# Patient Record
Sex: MALE | Race: OTHER | Employment: FULL TIME | URBAN - METROPOLITAN AREA
[De-identification: names, ages, dates, MRNs, and addresses within clinical notes are randomized per-mention and may not be internally consistent; named-entity substitution may affect disease eponyms.]

---

## 2017-08-03 ENCOUNTER — OFFICE VISIT (OUTPATIENT)
Dept: FAMILY MEDICINE CLINIC | Age: 42
End: 2017-08-03

## 2017-08-03 ENCOUNTER — HOSPITAL ENCOUNTER (OUTPATIENT)
Dept: OTHER | Age: 42
Discharge: HOME | End: 2017-08-03
Attending: FAMILY MEDICINE | Admitting: FAMILY MEDICINE

## 2017-08-03 ENCOUNTER — HOSPITAL ENCOUNTER (OUTPATIENT)
Dept: GENERAL RADIOLOGY | Age: 42
Discharge: HOME | End: 2017-08-03

## 2017-08-03 ENCOUNTER — HOSPITAL ENCOUNTER (OUTPATIENT)
Dept: GENERAL RADIOLOGY | Age: 42
Discharge: HOME | End: 2017-08-03

## 2017-08-03 VITALS
OXYGEN SATURATION: 96 % | DIASTOLIC BLOOD PRESSURE: 70 MMHG | WEIGHT: 244 LBS | HEART RATE: 86 BPM | BODY MASS INDEX: 33.09 KG/M2 | SYSTOLIC BLOOD PRESSURE: 120 MMHG

## 2017-08-03 DIAGNOSIS — M79.641 PAIN OF RIGHT HAND: ICD-10-CM

## 2017-08-03 DIAGNOSIS — M79.641 PAIN OF RIGHT HAND: Primary | ICD-10-CM

## 2017-08-03 DIAGNOSIS — M25.531 WRIST PAIN, ACUTE, RIGHT: ICD-10-CM

## 2017-08-03 DIAGNOSIS — M25.531 PAIN IN RIGHT WRIST: ICD-10-CM

## 2017-08-03 PROCEDURE — 99213 OFFICE O/P EST LOW 20 MIN: CPT | Performed by: FAMILY MEDICINE

## 2017-08-03 RX ORDER — IBUPROFEN 800 MG/1
800 TABLET ORAL EVERY 8 HOURS PRN
Qty: 90 TABLET | Refills: 2 | Status: SHIPPED | OUTPATIENT
Start: 2017-08-03 | End: 2019-10-17 | Stop reason: SDUPTHER

## 2017-08-09 ENCOUNTER — TELEPHONE (OUTPATIENT)
Dept: FAMILY MEDICINE CLINIC | Age: 42
End: 2017-08-09

## 2017-08-09 DIAGNOSIS — M79.641 PAIN OF RIGHT HAND: Primary | ICD-10-CM

## 2017-08-14 ENCOUNTER — OFFICE VISIT (OUTPATIENT)
Dept: FAMILY MEDICINE CLINIC | Age: 42
End: 2017-08-14

## 2017-08-14 VITALS
BODY MASS INDEX: 32.96 KG/M2 | TEMPERATURE: 98.4 F | HEART RATE: 103 BPM | OXYGEN SATURATION: 96 % | DIASTOLIC BLOOD PRESSURE: 80 MMHG | WEIGHT: 243 LBS | SYSTOLIC BLOOD PRESSURE: 118 MMHG

## 2017-08-14 DIAGNOSIS — E78.2 MIXED HYPERLIPIDEMIA: ICD-10-CM

## 2017-08-14 DIAGNOSIS — R42 DIZZY: ICD-10-CM

## 2017-08-14 DIAGNOSIS — R07.9 CHEST PAIN, UNSPECIFIED TYPE: ICD-10-CM

## 2017-08-14 DIAGNOSIS — Z00.00 ANNUAL PHYSICAL EXAM: Primary | ICD-10-CM

## 2017-08-14 DIAGNOSIS — H61.23 BILATERAL IMPACTED CERUMEN: ICD-10-CM

## 2017-08-14 PROCEDURE — 99396 PREV VISIT EST AGE 40-64: CPT | Performed by: FAMILY MEDICINE

## 2017-08-14 RX ORDER — CHOLECALCIFEROL (VITAMIN D3) 50 MCG
TABLET ORAL
Qty: 30 TABLET | Refills: 10 | Status: SHIPPED | OUTPATIENT
Start: 2017-08-14 | End: 2018-07-27 | Stop reason: SDUPTHER

## 2017-08-20 ENCOUNTER — HOSPITAL ENCOUNTER (OUTPATIENT)
Dept: OTHER | Age: 42
Discharge: HOME | End: 2017-08-20
Attending: FAMILY MEDICINE | Admitting: FAMILY MEDICINE

## 2017-08-20 LAB
A/G RATIO: 1.7 (ref 1.1–2.2)
ALBUMIN SERPL-MCNC: 4.6 G/DL (ref 3.4–5)
ALP BLD-CCNC: 81 U/L (ref 40–129)
ALT SERPL-CCNC: 14 U/L (ref 10–40)
ANION GAP SERPL CALCULATED.3IONS-SCNC: 11 MMOL/L (ref 3–16)
AST SERPL-CCNC: 18 U/L (ref 15–37)
BASOPHILS ABSOLUTE: 0 K/UL (ref 0–0.2)
BASOPHILS RELATIVE PERCENT: 0.6 %
BILIRUB SERPL-MCNC: 0.4 MG/DL (ref 0–1)
BUN BLDV-MCNC: 15 MG/DL (ref 7–20)
CALCIUM SERPL-MCNC: 9.8 MG/DL (ref 8.3–10.6)
CHLORIDE BLD-SCNC: 103 MMOL/L (ref 99–110)
CHOLESTEROL, TOTAL: 161 MG/DL (ref 0–199)
CO2: 28 MMOL/L (ref 21–32)
CREAT SERPL-MCNC: 0.7 MG/DL (ref 0.9–1.3)
EOSINOPHILS ABSOLUTE: 0.1 K/UL (ref 0–0.6)
EOSINOPHILS RELATIVE PERCENT: 1.6 %
GFR AFRICAN AMERICAN: >60
GFR NON-AFRICAN AMERICAN: >60
GLOBULIN: 2.7 G/DL
GLUCOSE BLD-MCNC: 106 MG/DL (ref 70–99)
HCT VFR BLD CALC: 43.7 % (ref 40.5–52.5)
HDLC SERPL-MCNC: 26 MG/DL (ref 40–60)
HEMOGLOBIN: 15 G/DL (ref 13.5–17.5)
LDL CHOLESTEROL CALCULATED: 80 MG/DL
LYMPHOCYTES ABSOLUTE: 1.4 K/UL (ref 1–5.1)
LYMPHOCYTES RELATIVE PERCENT: 21.3 %
MCH RBC QN AUTO: 29.5 PG (ref 26–34)
MCHC RBC AUTO-ENTMCNC: 34.3 G/DL (ref 31–36)
MCV RBC AUTO: 85.8 FL (ref 80–100)
MONOCYTES ABSOLUTE: 0.5 K/UL (ref 0–1.3)
MONOCYTES RELATIVE PERCENT: 6.9 %
NEUTROPHILS ABSOLUTE: 4.6 K/UL (ref 1.7–7.7)
NEUTROPHILS RELATIVE PERCENT: 69.6 %
PDW BLD-RTO: 12.7 % (ref 12.4–15.4)
PLATELET # BLD: 207 K/UL (ref 135–450)
PMV BLD AUTO: 9.1 FL (ref 5–10.5)
POTASSIUM SERPL-SCNC: 5 MMOL/L (ref 3.5–5.1)
RBC # BLD: 5.09 M/UL (ref 4.2–5.9)
SODIUM BLD-SCNC: 142 MMOL/L (ref 136–145)
TOTAL PROTEIN: 7.3 G/DL (ref 6.4–8.2)
TRIGL SERPL-MCNC: 274 MG/DL (ref 0–150)
VITAMIN D 25-HYDROXY: 27.9 NG/ML
VLDLC SERPL CALC-MCNC: 55 MG/DL
WBC # BLD: 6.6 K/UL (ref 4–11)

## 2017-08-21 DIAGNOSIS — R73.01 IFG (IMPAIRED FASTING GLUCOSE): Primary | ICD-10-CM

## 2017-08-22 ENCOUNTER — OFFICE VISIT (OUTPATIENT)
Dept: ORTHOPEDIC SURGERY | Age: 42
End: 2017-08-22

## 2017-08-22 VITALS — BODY MASS INDEX: 32.91 KG/M2 | WEIGHT: 242.95 LBS | HEIGHT: 72 IN

## 2017-08-22 DIAGNOSIS — S63.501A WRIST SPRAIN, RIGHT, INITIAL ENCOUNTER: Primary | ICD-10-CM

## 2017-08-22 PROBLEM — S63.509A WRIST SPRAIN: Status: ACTIVE | Noted: 2017-08-22

## 2017-08-22 LAB
ESTIMATED AVERAGE GLUCOSE: 111.2 MG/DL
HBA1C MFR BLD: 5.5 %

## 2017-08-22 PROCEDURE — 99243 OFF/OP CNSLTJ NEW/EST LOW 30: CPT | Performed by: ORTHOPAEDIC SURGERY

## 2017-08-22 PROCEDURE — L3918 METACARP FX ORTHOSIS PRE OTS: HCPCS | Performed by: ORTHOPAEDIC SURGERY

## 2017-10-30 ENCOUNTER — OFFICE VISIT (OUTPATIENT)
Dept: FAMILY MEDICINE CLINIC | Age: 42
End: 2017-10-30

## 2017-10-30 VITALS
HEART RATE: 96 BPM | WEIGHT: 247 LBS | BODY MASS INDEX: 33.46 KG/M2 | SYSTOLIC BLOOD PRESSURE: 120 MMHG | DIASTOLIC BLOOD PRESSURE: 84 MMHG | OXYGEN SATURATION: 96 %

## 2017-10-30 DIAGNOSIS — Z23 NEEDS FLU SHOT: ICD-10-CM

## 2017-10-30 DIAGNOSIS — R42 VERTIGO: Primary | ICD-10-CM

## 2017-10-30 DIAGNOSIS — R06.83 SNORES: ICD-10-CM

## 2017-10-30 DIAGNOSIS — G56.01 CARPAL TUNNEL SYNDROME OF RIGHT WRIST: ICD-10-CM

## 2017-10-30 PROCEDURE — G8427 DOCREV CUR MEDS BY ELIG CLIN: HCPCS | Performed by: FAMILY MEDICINE

## 2017-10-30 PROCEDURE — G8484 FLU IMMUNIZE NO ADMIN: HCPCS | Performed by: FAMILY MEDICINE

## 2017-10-30 PROCEDURE — 90471 IMMUNIZATION ADMIN: CPT | Performed by: FAMILY MEDICINE

## 2017-10-30 PROCEDURE — 1036F TOBACCO NON-USER: CPT | Performed by: FAMILY MEDICINE

## 2017-10-30 PROCEDURE — G8417 CALC BMI ABV UP PARAM F/U: HCPCS | Performed by: FAMILY MEDICINE

## 2017-10-30 PROCEDURE — 99214 OFFICE O/P EST MOD 30 MIN: CPT | Performed by: FAMILY MEDICINE

## 2017-10-30 PROCEDURE — 90630 INFLUENZA, QUADV, 18-64 YRS, ID, PF, MICRO INJ, 0.1ML (FLUZONE QUADV, PF): CPT | Performed by: FAMILY MEDICINE

## 2017-10-30 RX ORDER — MECLIZINE HYDROCHLORIDE CHEWABLE TABLETS 25 MG/1
25 TABLET, CHEWABLE ORAL 3 TIMES DAILY PRN
Qty: 30 TABLET | Refills: 0 | Status: SHIPPED | OUTPATIENT
Start: 2017-10-30 | End: 2019-09-24 | Stop reason: ALTCHOICE

## 2017-10-30 ASSESSMENT — ENCOUNTER SYMPTOMS
CHOKING: 0
EYES NEGATIVE: 1
COUGH: 0
APNEA: 0
SHORTNESS OF BREATH: 0

## 2017-10-30 NOTE — PROGRESS NOTES
Vaccine Information Sheet, \"Influenza - Inactivated\"  given to [employer], or parent/legal guardian of  [place] and verbalized understanding. Patient responses:    Have you ever had a reaction to a flu vaccine? No  Are you able to eat eggs without adverse effects? Yes  Do you have any current illness? No  Have you ever had Guillian Lennox Syndrome? No    Flu vaccine given per order. Please see immunization tab.
nl, Neck supple, no lad or tmg, no bruit  CV RRR, Lungs CTA  Abd soft, NT, no masses or hsm, BS nl  Ext with strong pedal pulses, no edema    Assessment:            Plan:      Mahad Cummings was seen today for dizziness and other. Diagnoses and all orders for this visit:    Vertigo x 4  -     meclizine (ANTIVERT) 25 MG CHEW; Take 1 tablet by mouth 3 times daily as needed (dizziness)  -     External Referral To ENT    Snores  -     External Referral To ENT, possible deviated septum    Carpal tunnel syndrome of right wrist   Pt is asked to wear splint that Dr Jessica Irene gave him to wear at work at .     Needs flu shot  -     INFLUENZA, QUADV, 18-64 YRS, ID, PF, MICRO INJ, 0.1ML (FLUZONE QUADV, PF)

## 2017-12-20 ENCOUNTER — OFFICE VISIT (OUTPATIENT)
Dept: FAMILY MEDICINE CLINIC | Age: 42
End: 2017-12-20

## 2017-12-20 VITALS
BODY MASS INDEX: 33.21 KG/M2 | TEMPERATURE: 97.8 F | WEIGHT: 245.2 LBS | OXYGEN SATURATION: 97 % | DIASTOLIC BLOOD PRESSURE: 84 MMHG | HEART RATE: 68 BPM | SYSTOLIC BLOOD PRESSURE: 124 MMHG

## 2017-12-20 DIAGNOSIS — R30.9 PAINFUL URINATION: Primary | ICD-10-CM

## 2017-12-20 LAB
BILIRUBIN, POC: NORMAL
BLOOD URINE, POC: NORMAL
CLARITY, POC: CLEAR
COLOR, POC: YELLOW
GLUCOSE URINE, POC: NORMAL
KETONES, POC: NORMAL
LEUKOCYTE EST, POC: NORMAL
NITRITE, POC: NORMAL
PH, POC: 6
PROTEIN, POC: 30
SPECIFIC GRAVITY, POC: 1.02
UROBILINOGEN, POC: 0.2

## 2017-12-20 PROCEDURE — G8417 CALC BMI ABV UP PARAM F/U: HCPCS | Performed by: FAMILY MEDICINE

## 2017-12-20 PROCEDURE — 99213 OFFICE O/P EST LOW 20 MIN: CPT | Performed by: FAMILY MEDICINE

## 2017-12-20 PROCEDURE — G8484 FLU IMMUNIZE NO ADMIN: HCPCS | Performed by: FAMILY MEDICINE

## 2017-12-20 PROCEDURE — 81002 URINALYSIS NONAUTO W/O SCOPE: CPT | Performed by: FAMILY MEDICINE

## 2017-12-20 PROCEDURE — G8427 DOCREV CUR MEDS BY ELIG CLIN: HCPCS | Performed by: FAMILY MEDICINE

## 2017-12-20 PROCEDURE — 1036F TOBACCO NON-USER: CPT | Performed by: FAMILY MEDICINE

## 2017-12-20 RX ORDER — CIPROFLOXACIN 500 MG/1
500 TABLET, FILM COATED ORAL 2 TIMES DAILY
Qty: 20 TABLET | Refills: 0 | Status: SHIPPED | OUTPATIENT
Start: 2017-12-20 | End: 2017-12-30

## 2017-12-20 RX ORDER — PHENAZOPYRIDINE HYDROCHLORIDE 100 MG/1
100 TABLET, FILM COATED ORAL 3 TIMES DAILY PRN
Qty: 15 TABLET | Refills: 0 | Status: SHIPPED | OUTPATIENT
Start: 2017-12-20 | End: 2019-09-24 | Stop reason: ALTCHOICE

## 2017-12-20 NOTE — PROGRESS NOTES
Subjective:      Patient ID: Letty Hameed is a 36 y.o. male. HPI  Chief Complaint   Patient presents with    Urinary Tract Infection   Awoke 3 am per usual for work, immediately at end of 1st void and since, has burning sensation. Also noted mild mid- lower back. Left work at 5:30 am b/c of urinary freq, shaking as if he was cold, unsure if he had fever. No chance for STI. Has burning sensation between legs in recent hours. Dad had kidney stones. Review of Systems   Constitutional: Positive for chills. Negative for activity change, appetite change, diaphoresis, fatigue and unexpected weight change. HENT: Negative. Respiratory: Negative for chest tightness and shortness of breath. Cardiovascular: Negative for chest pain and palpitations. Gastrointestinal: Negative for abdominal pain, nausea and vomiting. Genitourinary: Positive for dysuria, flank pain, frequency and urgency. Negative for difficulty urinating and enuresis. Objective:   Physical Exam  /84   Pulse 68   Temp 97.8 °F (36.6 °C) (Oral)   Wt 245 lb 3.2 oz (111.2 kg)   SpO2 97%   BMI 33.21 kg/m²   No CVAT  Abd soft, NT, no masses or hsm  Assessment:            Plan:      Nora Lam was seen today for urinary tract infection. Diagnoses and all orders for this visit:    Painful urination, uti vs prostatitis vs stone  -     POCT Urinalysis no Micro, trace blood  -     ciprofloxacin (CIPRO) 500 MG tablet; Take 1 tablet by mouth 2 times daily for 10 days  -     URINE CULTURE  -     phenazopyridine (PYRIDIUM) 100 MG tablet; Take 1 tablet by mouth 3 times daily as needed for Pain (urinary pain)   It not improving by time pt is called with urine cx results, will plan for CT with stone protocol.

## 2017-12-21 ASSESSMENT — ENCOUNTER SYMPTOMS
SHORTNESS OF BREATH: 0
CHEST TIGHTNESS: 0
ABDOMINAL PAIN: 0
VOMITING: 0
NAUSEA: 0

## 2017-12-22 LAB — URINE CULTURE, ROUTINE: NORMAL

## 2018-07-27 RX ORDER — CHOLECALCIFEROL (VITAMIN D3) 50 MCG
TABLET ORAL
Qty: 30 TABLET | Refills: 5 | Status: SHIPPED | OUTPATIENT
Start: 2018-07-27 | End: 2019-01-18 | Stop reason: SDUPTHER

## 2018-08-23 ENCOUNTER — OFFICE VISIT (OUTPATIENT)
Dept: FAMILY MEDICINE CLINIC | Age: 43
End: 2018-08-23

## 2018-08-23 VITALS
WEIGHT: 256 LBS | TEMPERATURE: 98.2 F | DIASTOLIC BLOOD PRESSURE: 80 MMHG | HEART RATE: 80 BPM | BODY MASS INDEX: 34.72 KG/M2 | SYSTOLIC BLOOD PRESSURE: 120 MMHG

## 2018-08-23 DIAGNOSIS — H61.23 BILATERAL IMPACTED CERUMEN: Primary | ICD-10-CM

## 2018-08-23 PROCEDURE — 69210 REMOVE IMPACTED EAR WAX UNI: CPT | Performed by: NURSE PRACTITIONER

## 2018-08-23 PROCEDURE — G8427 DOCREV CUR MEDS BY ELIG CLIN: HCPCS | Performed by: NURSE PRACTITIONER

## 2018-08-23 PROCEDURE — 1036F TOBACCO NON-USER: CPT | Performed by: NURSE PRACTITIONER

## 2018-08-23 PROCEDURE — G8417 CALC BMI ABV UP PARAM F/U: HCPCS | Performed by: NURSE PRACTITIONER

## 2018-08-23 PROCEDURE — 99213 OFFICE O/P EST LOW 20 MIN: CPT | Performed by: NURSE PRACTITIONER

## 2018-08-23 ASSESSMENT — ENCOUNTER SYMPTOMS
COUGH: 0
SHORTNESS OF BREATH: 0

## 2018-08-28 ENCOUNTER — HOSPITAL ENCOUNTER (EMERGENCY)
Age: 43
Discharge: HOME | End: 2018-08-28
Payer: MEDICAID

## 2018-08-28 VITALS
OXYGEN SATURATION: 98 % | BODY MASS INDEX: 15.58 KG/M2 | HEART RATE: 92 BPM | RESPIRATION RATE: 16 BRPM | HEIGHT: 72 IN | WEIGHT: 115 LBS | TEMPERATURE: 97.8 F | SYSTOLIC BLOOD PRESSURE: 129 MMHG | DIASTOLIC BLOOD PRESSURE: 89 MMHG

## 2018-08-28 DIAGNOSIS — H10.13 ALLERGIC CONJUNCTIVITIS OF BOTH EYES: Primary | ICD-10-CM

## 2018-08-28 PROCEDURE — 99282 EMERGENCY DEPT VISIT SF MDM: CPT

## 2018-08-28 PROCEDURE — 6370000000 HC RX 637 (ALT 250 FOR IP): Performed by: EMERGENCY MEDICINE

## 2018-08-28 RX ORDER — KETOTIFEN FUMARATE 0.35 MG/ML
1 SOLUTION/ DROPS OPHTHALMIC 2 TIMES DAILY
Qty: 5 ML | Refills: 0 | Status: SHIPPED | OUTPATIENT
Start: 2018-08-28 | End: 2018-09-07

## 2018-08-28 RX ORDER — TETRACAINE HYDROCHLORIDE 5 MG/ML
1 SOLUTION OPHTHALMIC ONCE
Status: COMPLETED | OUTPATIENT
Start: 2018-08-28 | End: 2018-08-28

## 2018-08-28 RX ADMIN — TETRACAINE HYDROCHLORIDE 1 DROP: 5 SOLUTION OPHTHALMIC at 20:46

## 2018-08-28 RX ADMIN — Medication 1 MG: at 20:46

## 2018-08-28 ASSESSMENT — PAIN DESCRIPTION - LOCATION: LOCATION: EYE

## 2018-08-28 ASSESSMENT — PAIN DESCRIPTION - ONSET: ONSET: ON-GOING

## 2018-08-28 ASSESSMENT — PAIN SCALES - GENERAL
PAINLEVEL_OUTOF10: 3
PAINLEVEL_OUTOF10: 4

## 2018-08-28 ASSESSMENT — PAIN DESCRIPTION - PAIN TYPE: TYPE: ACUTE PAIN

## 2018-08-28 ASSESSMENT — PAIN DESCRIPTION - DESCRIPTORS: DESCRIPTORS: BURNING;STABBING;SHARP

## 2018-08-28 ASSESSMENT — PAIN DESCRIPTION - FREQUENCY: FREQUENCY: CONTINUOUS

## 2018-08-28 ASSESSMENT — PAIN DESCRIPTION - PROGRESSION: CLINICAL_PROGRESSION: NOT CHANGED

## 2018-08-28 ASSESSMENT — PAIN DESCRIPTION - ORIENTATION: ORIENTATION: RIGHT

## 2018-10-24 ENCOUNTER — NURSE ONLY (OUTPATIENT)
Dept: FAMILY MEDICINE CLINIC | Age: 43
End: 2018-10-24
Payer: MEDICAID

## 2018-10-24 DIAGNOSIS — Z23 NEED FOR VACCINATION: Primary | ICD-10-CM

## 2018-10-24 PROCEDURE — 90471 IMMUNIZATION ADMIN: CPT | Performed by: FAMILY MEDICINE

## 2018-10-24 PROCEDURE — 90686 IIV4 VACC NO PRSV 0.5 ML IM: CPT | Performed by: FAMILY MEDICINE

## 2019-01-18 RX ORDER — CHOLECALCIFEROL (VITAMIN D3) 50 MCG
TABLET ORAL
Qty: 30 TABLET | Refills: 5 | Status: SHIPPED | OUTPATIENT
Start: 2019-01-18 | End: 2019-07-17 | Stop reason: SDUPTHER

## 2019-02-26 ENCOUNTER — OFFICE VISIT (OUTPATIENT)
Dept: FAMILY MEDICINE CLINIC | Age: 44
End: 2019-02-26
Payer: MEDICAID

## 2019-02-26 VITALS
HEART RATE: 100 BPM | DIASTOLIC BLOOD PRESSURE: 88 MMHG | WEIGHT: 149 LBS | TEMPERATURE: 99.3 F | OXYGEN SATURATION: 98 % | BODY MASS INDEX: 20.21 KG/M2 | SYSTOLIC BLOOD PRESSURE: 132 MMHG

## 2019-02-26 DIAGNOSIS — R52 GENERALIZED BODY ACHES: ICD-10-CM

## 2019-02-26 DIAGNOSIS — J01.90 ACUTE BACTERIAL SINUSITIS: Primary | ICD-10-CM

## 2019-02-26 DIAGNOSIS — R05.9 COUGH: ICD-10-CM

## 2019-02-26 DIAGNOSIS — B96.89 ACUTE BACTERIAL SINUSITIS: Primary | ICD-10-CM

## 2019-02-26 PROCEDURE — G8482 FLU IMMUNIZE ORDER/ADMIN: HCPCS | Performed by: NURSE PRACTITIONER

## 2019-02-26 PROCEDURE — G8427 DOCREV CUR MEDS BY ELIG CLIN: HCPCS | Performed by: NURSE PRACTITIONER

## 2019-02-26 PROCEDURE — 87804 INFLUENZA ASSAY W/OPTIC: CPT | Performed by: NURSE PRACTITIONER

## 2019-02-26 PROCEDURE — G8420 CALC BMI NORM PARAMETERS: HCPCS | Performed by: NURSE PRACTITIONER

## 2019-02-26 PROCEDURE — 99213 OFFICE O/P EST LOW 20 MIN: CPT | Performed by: NURSE PRACTITIONER

## 2019-02-26 PROCEDURE — 1036F TOBACCO NON-USER: CPT | Performed by: NURSE PRACTITIONER

## 2019-02-26 RX ORDER — CEFUROXIME AXETIL 500 MG/1
500 TABLET ORAL 2 TIMES DAILY
Qty: 20 TABLET | Refills: 0 | Status: SHIPPED | OUTPATIENT
Start: 2019-02-26 | End: 2019-03-08

## 2019-02-26 ASSESSMENT — ENCOUNTER SYMPTOMS
SINUS PRESSURE: 1
SINUS PAIN: 1
SORE THROAT: 1
SHORTNESS OF BREATH: 1
RHINORRHEA: 1
ABDOMINAL PAIN: 0
COUGH: 1
CHEST TIGHTNESS: 1
VOICE CHANGE: 1
EYES NEGATIVE: 1

## 2019-04-22 ENCOUNTER — OFFICE VISIT (OUTPATIENT)
Dept: FAMILY MEDICINE CLINIC | Age: 44
End: 2019-04-22
Payer: MEDICAID

## 2019-04-22 VITALS
WEIGHT: 241.4 LBS | TEMPERATURE: 98.4 F | HEART RATE: 80 BPM | BODY MASS INDEX: 32.74 KG/M2 | DIASTOLIC BLOOD PRESSURE: 80 MMHG | SYSTOLIC BLOOD PRESSURE: 120 MMHG | OXYGEN SATURATION: 97 %

## 2019-04-22 DIAGNOSIS — H61.23 BILATERAL IMPACTED CERUMEN: Primary | ICD-10-CM

## 2019-04-22 PROCEDURE — 99213 OFFICE O/P EST LOW 20 MIN: CPT | Performed by: NURSE PRACTITIONER

## 2019-04-22 PROCEDURE — G8427 DOCREV CUR MEDS BY ELIG CLIN: HCPCS | Performed by: NURSE PRACTITIONER

## 2019-04-22 PROCEDURE — 1036F TOBACCO NON-USER: CPT | Performed by: NURSE PRACTITIONER

## 2019-04-22 PROCEDURE — G8417 CALC BMI ABV UP PARAM F/U: HCPCS | Performed by: NURSE PRACTITIONER

## 2019-04-22 ASSESSMENT — ENCOUNTER SYMPTOMS
CHEST TIGHTNESS: 0
COLOR CHANGE: 0
NAUSEA: 0
COUGH: 0
SHORTNESS OF BREATH: 0
SINUS PAIN: 0
SINUS PRESSURE: 0
SORE THROAT: 0
ABDOMINAL PAIN: 0
TROUBLE SWALLOWING: 0
BACK PAIN: 0
EYE DISCHARGE: 0

## 2019-04-22 NOTE — PROGRESS NOTES
2019     Loni Rocha (:  1975) is a 39 y.o. male, here for evaluation of the following medical concerns: He is here today with clogged ears. This began about a month ago. No treatments at home. Other   This is a recurrent problem. The current episode started more than 1 month ago. The problem has been gradually worsening. Pertinent negatives include no abdominal pain, chest pain, chills, congestion, coughing, diaphoresis, fatigue, fever, headaches, nausea, rash, sore throat or weakness. Nothing aggravates the symptoms. He has tried nothing for the symptoms. The treatment provided no relief. Review of Systems   Constitutional: Negative for appetite change, chills, diaphoresis, fatigue and fever. HENT: Negative for congestion, ear discharge, ear pain, postnasal drip, sinus pressure, sinus pain, sore throat and trouble swallowing. Ears clogged   Eyes: Negative for discharge. Respiratory: Negative for cough, chest tightness and shortness of breath. Cardiovascular: Negative for chest pain and palpitations. Gastrointestinal: Negative for abdominal pain and nausea. Endocrine: Negative for cold intolerance, heat intolerance and polyuria. Musculoskeletal: Negative for back pain and gait problem. Skin: Negative for color change and rash. Allergic/Immunologic: Negative for environmental allergies, food allergies and immunocompromised state. Neurological: Negative for dizziness, weakness and headaches. Hematological: Does not bruise/bleed easily. Psychiatric/Behavioral: Negative for confusion and sleep disturbance. The patient is not nervous/anxious. Prior to Visit Medications    Medication Sig Taking?  Authorizing Provider   VITAMIN D-3 SUPER STRENGTH 2000 units TABS TAKE 1 TABLET BY MOUTH Rosalinda Segura MD   ibuprofen (ADVIL;MOTRIN) 800 MG tablet Take 1 tablet by mouth every 8 hours as needed for Pain Yes Edi Jefferson MD   phenazopyridine (PYRIDIUM) 100 MG tablet Take 1 tablet by mouth 3 times daily as needed for Pain (urinary pain)  Yanique Cruz MD   meclizine (ANTIVERT) 25 MG CHEW Take 1 tablet by mouth 3 times daily as needed (dizziness)  Yanique Cruz MD        Social History     Tobacco Use    Smoking status: Former Smoker     Packs/day: 3.00     Types: Cigars     Last attempt to quit: 7/1/2015     Years since quitting: 3.8    Smokeless tobacco: Former User     Quit date: 11/15/2015    Tobacco comment: 2 cigars/day previously   Substance Use Topics    Alcohol use: No     Alcohol/week: 0.0 oz        Vitals:    04/22/19 1628   BP: 120/80   Site: Left Upper Arm   Pulse: 80   Temp: 98.4 °F (36.9 °C)   TempSrc: Oral   SpO2: 97%   Weight: 241 lb 6.4 oz (109.5 kg)     Estimated body mass index is 32.74 kg/m² as calculated from the following:    Height as of 8/28/18: 6' (1.829 m). Weight as of this encounter: 241 lb 6.4 oz (109.5 kg). Physical Exam   Constitutional: He is oriented to person, place, and time. He appears well-developed and well-nourished. No distress. HENT:   Head: Normocephalic and atraumatic. Right Ear: External ear normal.   Left Ear: External ear normal.   Ears:    Nose: Nose normal.   Mouth/Throat: Oropharynx is clear and moist.   Eyes: Pupils are equal, round, and reactive to light. Conjunctivae and EOM are normal. Right eye exhibits no discharge. Left eye exhibits no discharge. Neck: Normal range of motion. Neck supple. Cardiovascular: Normal rate, regular rhythm and normal heart sounds. Pulmonary/Chest: Effort normal and breath sounds normal. No respiratory distress. Abdominal: Soft. Musculoskeletal: Normal range of motion. He exhibits no tenderness. Neurological: He is alert and oriented to person, place, and time. Skin: Skin is warm and dry. He is not diaphoretic. Psychiatric: He has a normal mood and affect.  His behavior is normal. Judgment and thought content normal.   Vitals reviewed. ASSESSMENT/PLAN:  Mera Hamm was seen today for cerumen impaction. Diagnoses and all orders for this visit:    Bilateral impacted cerumen  -     Remove impacted ear wax  Bilateral: Debrox placed, water and peroxide used to clean out cerumen    Return if symptoms worsen or fail to improve. An electronic signature was used to authenticate this note. --JAZMINE Maddox on 4/22/2019 at5:02 PM

## 2019-05-14 ENCOUNTER — TELEPHONE (OUTPATIENT)
Dept: FAMILY MEDICINE CLINIC | Age: 44
End: 2019-05-14

## 2019-05-15 RX ORDER — POLYETHYLENE GLYCOL 3350 17 G/17G
17 POWDER, FOR SOLUTION ORAL DAILY
Qty: 1 BOTTLE | Refills: 5 | Status: SHIPPED | OUTPATIENT
Start: 2019-05-15 | End: 2019-09-12 | Stop reason: SDUPTHER

## 2019-06-03 ENCOUNTER — TELEPHONE (OUTPATIENT)
Dept: FAMILY MEDICINE CLINIC | Age: 44
End: 2019-06-03

## 2019-06-03 ENCOUNTER — OFFICE VISIT (OUTPATIENT)
Dept: FAMILY MEDICINE CLINIC | Age: 44
End: 2019-06-03
Payer: MEDICAID

## 2019-06-03 VITALS
DIASTOLIC BLOOD PRESSURE: 70 MMHG | HEIGHT: 72 IN | BODY MASS INDEX: 31.37 KG/M2 | OXYGEN SATURATION: 98 % | HEART RATE: 77 BPM | SYSTOLIC BLOOD PRESSURE: 100 MMHG | WEIGHT: 231.6 LBS

## 2019-06-03 DIAGNOSIS — R20.0 NUMBNESS AND TINGLING OF BOTH LEGS: Primary | ICD-10-CM

## 2019-06-03 DIAGNOSIS — R42 VERTIGO: ICD-10-CM

## 2019-06-03 DIAGNOSIS — R20.2 NUMBNESS AND TINGLING OF BOTH LEGS: Primary | ICD-10-CM

## 2019-06-03 DIAGNOSIS — R30.0 DYSURIA: ICD-10-CM

## 2019-06-03 PROCEDURE — 1036F TOBACCO NON-USER: CPT | Performed by: PHYSICIAN ASSISTANT

## 2019-06-03 PROCEDURE — 81002 URINALYSIS NONAUTO W/O SCOPE: CPT | Performed by: PHYSICIAN ASSISTANT

## 2019-06-03 PROCEDURE — 99213 OFFICE O/P EST LOW 20 MIN: CPT | Performed by: PHYSICIAN ASSISTANT

## 2019-06-03 PROCEDURE — G8427 DOCREV CUR MEDS BY ELIG CLIN: HCPCS | Performed by: PHYSICIAN ASSISTANT

## 2019-06-03 PROCEDURE — 36415 COLL VENOUS BLD VENIPUNCTURE: CPT | Performed by: PHYSICIAN ASSISTANT

## 2019-06-03 PROCEDURE — G8417 CALC BMI ABV UP PARAM F/U: HCPCS | Performed by: PHYSICIAN ASSISTANT

## 2019-06-03 ASSESSMENT — ENCOUNTER SYMPTOMS: RESPIRATORY NEGATIVE: 1

## 2019-06-03 NOTE — TELEPHONE ENCOUNTER
Pls tell her I unfortunately don't have any availability today. Pls offer [facility] or local overflow.  Thx

## 2019-06-03 NOTE — TELEPHONE ENCOUNTER
Patient's wife stated the patient started with dizziness and headache last night and has gotten worse this morning. He woke up this morning around 730 and both of his legs are numb as well. Wife is wanting the patient to see Dr. Poonam Robbins. There is no availability with [facility] or the NP today. Wife would like to be called back as soon as possible and try to get him seen today.

## 2019-06-04 LAB
BILIRUBIN, POC: NORMAL
BLOOD URINE, POC: NORMAL
CLARITY, POC: NORMAL
COLOR, POC: NORMAL
FOLATE: 14.17 NG/ML (ref 4.78–24.2)
GLUCOSE URINE, POC: NORMAL
HCT VFR BLD CALC: 43.4 % (ref 40.5–52.5)
HEMOGLOBIN: 15.1 G/DL (ref 13.5–17.5)
KETONES, POC: 40
LEUKOCYTE EST, POC: NORMAL
MCH RBC QN AUTO: 29.4 PG (ref 26–34)
MCHC RBC AUTO-ENTMCNC: 34.8 G/DL (ref 31–36)
MCV RBC AUTO: 84.7 FL (ref 80–100)
NITRITE, POC: NORMAL
PDW BLD-RTO: 12.6 % (ref 12.4–15.4)
PH, POC: 5
PLATELET # BLD: 221 K/UL (ref 135–450)
PMV BLD AUTO: 8.5 FL (ref 5–10.5)
PROTEIN, POC: NORMAL
RBC # BLD: 5.12 M/UL (ref 4.2–5.9)
SPECIFIC GRAVITY, POC: 1.03
T4 TOTAL: 8.2 UG/DL (ref 4.5–10.9)
TSH SERPL DL<=0.05 MIU/L-ACNC: 1.68 UIU/ML (ref 0.27–4.2)
UROBILINOGEN, POC: 0.2
VITAMIN B-12: 330 PG/ML (ref 211–911)
VITAMIN D 25-HYDROXY: 34.8 NG/ML
WBC # BLD: 6.2 K/UL (ref 4–11)

## 2019-09-24 ENCOUNTER — APPOINTMENT (OUTPATIENT)
Dept: GENERAL RADIOLOGY | Age: 44
End: 2019-09-24
Payer: MEDICAID

## 2019-09-24 ENCOUNTER — HOSPITAL ENCOUNTER (EMERGENCY)
Age: 44
Discharge: HOME | End: 2019-09-24
Attending: EMERGENCY MEDICINE
Payer: MEDICAID

## 2019-09-24 VITALS
OXYGEN SATURATION: 96 % | HEART RATE: 91 BPM | HEIGHT: 72 IN | DIASTOLIC BLOOD PRESSURE: 85 MMHG | WEIGHT: 230 LBS | BODY MASS INDEX: 31.15 KG/M2 | RESPIRATION RATE: 16 BRPM | SYSTOLIC BLOOD PRESSURE: 127 MMHG | TEMPERATURE: 98.9 F

## 2019-09-24 DIAGNOSIS — J02.0 STREPTOCOCCAL SORE THROAT: Primary | ICD-10-CM

## 2019-09-24 LAB
A/G RATIO: 1.7 (ref 1.1–2.2)
ALBUMIN SERPL-MCNC: 4.7 G/DL (ref 3.4–5)
ALP BLD-CCNC: 74 U/L (ref 40–129)
ALT SERPL-CCNC: 27 U/L (ref 10–40)
ANION GAP SERPL CALCULATED.3IONS-SCNC: 12 MMOL/L (ref 3–16)
AST SERPL-CCNC: 23 U/L (ref 15–37)
BACTERIA: ABNORMAL /HPF
BASOPHILS ABSOLUTE: 0 K/UL (ref 0–0.2)
BASOPHILS RELATIVE PERCENT: 0.4 %
BILIRUB SERPL-MCNC: 0.5 MG/DL (ref 0–1)
BILIRUBIN URINE: ABNORMAL
BLOOD, URINE: ABNORMAL
BUN BLDV-MCNC: 10 MG/DL (ref 7–20)
CALCIUM SERPL-MCNC: 9.7 MG/DL (ref 8.3–10.6)
CHLORIDE BLD-SCNC: 105 MMOL/L (ref 99–110)
CLARITY: CLEAR
CO2: 25 MMOL/L (ref 21–32)
COLOR: YELLOW
CREAT SERPL-MCNC: 0.8 MG/DL (ref 0.9–1.3)
EOSINOPHILS ABSOLUTE: 0 K/UL (ref 0–0.6)
EOSINOPHILS RELATIVE PERCENT: 0.4 %
GFR AFRICAN AMERICAN: >60
GFR NON-AFRICAN AMERICAN: >60
GLOBULIN: 2.8 G/DL
GLUCOSE BLD-MCNC: 105 MG/DL (ref 70–99)
GLUCOSE URINE: NEGATIVE MG/DL
HCT VFR BLD CALC: 43.5 % (ref 40.5–52.5)
HEMOGLOBIN: 14.9 G/DL (ref 13.5–17.5)
KETONES, URINE: >=80 MG/DL
LACTIC ACID: 0.6 MMOL/L (ref 0.4–2)
LEUKOCYTE ESTERASE, URINE: NEGATIVE
LYMPHOCYTES ABSOLUTE: 0.7 K/UL (ref 1–5.1)
LYMPHOCYTES RELATIVE PERCENT: 6 %
MCH RBC QN AUTO: 29 PG (ref 26–34)
MCHC RBC AUTO-ENTMCNC: 34.4 G/DL (ref 31–36)
MCV RBC AUTO: 84.3 FL (ref 80–100)
MICROSCOPIC EXAMINATION: YES
MONOCYTES ABSOLUTE: 0.7 K/UL (ref 0–1.3)
MONOCYTES RELATIVE PERCENT: 6.2 %
MUCUS: ABNORMAL /LPF
NEUTROPHILS ABSOLUTE: 10.5 K/UL (ref 1.7–7.7)
NEUTROPHILS RELATIVE PERCENT: 87 %
NITRITE, URINE: NEGATIVE
PDW BLD-RTO: 13.2 % (ref 12.4–15.4)
PH UA: 6.5 (ref 5–8)
PLATELET # BLD: 188 K/UL (ref 135–450)
PMV BLD AUTO: 7.6 FL (ref 5–10.5)
POTASSIUM SERPL-SCNC: 4.1 MMOL/L (ref 3.5–5.1)
PROTEIN UA: ABNORMAL MG/DL
RAPID INFLUENZA  B AGN: NEGATIVE
RAPID INFLUENZA A AGN: NEGATIVE
RBC # BLD: 5.16 M/UL (ref 4.2–5.9)
RBC UA: ABNORMAL /HPF (ref 0–2)
S PYO AG THROAT QL: POSITIVE
SODIUM BLD-SCNC: 142 MMOL/L (ref 136–145)
SPECIFIC GRAVITY UA: 1.02 (ref 1–1.03)
TOTAL PROTEIN: 7.5 G/DL (ref 6.4–8.2)
TROPONIN: <0.01 NG/ML
URINE TYPE: ABNORMAL
UROBILINOGEN, URINE: 0.2 E.U./DL
WBC # BLD: 12.1 K/UL (ref 4–11)
WBC UA: ABNORMAL /HPF (ref 0–5)

## 2019-09-24 PROCEDURE — 81001 URINALYSIS AUTO W/SCOPE: CPT

## 2019-09-24 PROCEDURE — 83605 ASSAY OF LACTIC ACID: CPT

## 2019-09-24 PROCEDURE — 80053 COMPREHEN METABOLIC PANEL: CPT

## 2019-09-24 PROCEDURE — 2580000003 HC RX 258: Performed by: PHYSICIAN ASSISTANT

## 2019-09-24 PROCEDURE — 85025 COMPLETE CBC W/AUTO DIFF WBC: CPT

## 2019-09-24 PROCEDURE — 93005 ELECTROCARDIOGRAM TRACING: CPT | Performed by: EMERGENCY MEDICINE

## 2019-09-24 PROCEDURE — 99283 EMERGENCY DEPT VISIT LOW MDM: CPT

## 2019-09-24 PROCEDURE — 87880 STREP A ASSAY W/OPTIC: CPT

## 2019-09-24 PROCEDURE — 6370000000 HC RX 637 (ALT 250 FOR IP): Performed by: PHYSICIAN ASSISTANT

## 2019-09-24 PROCEDURE — 84484 ASSAY OF TROPONIN QUANT: CPT

## 2019-09-24 PROCEDURE — 71046 X-RAY EXAM CHEST 2 VIEWS: CPT

## 2019-09-24 PROCEDURE — 96360 HYDRATION IV INFUSION INIT: CPT

## 2019-09-24 PROCEDURE — 87804 INFLUENZA ASSAY W/OPTIC: CPT

## 2019-09-24 PROCEDURE — 6360000002 HC RX W HCPCS: Performed by: PHYSICIAN ASSISTANT

## 2019-09-24 RX ORDER — 0.9 % SODIUM CHLORIDE 0.9 %
1000 INTRAVENOUS SOLUTION INTRAVENOUS ONCE
Status: COMPLETED | OUTPATIENT
Start: 2019-09-24 | End: 2019-09-24

## 2019-09-24 RX ORDER — ACETAMINOPHEN 325 MG/1
650 TABLET ORAL ONCE
Status: COMPLETED | OUTPATIENT
Start: 2019-09-24 | End: 2019-09-24

## 2019-09-24 RX ORDER — PENICILLIN V POTASSIUM 250 MG/1
500 TABLET ORAL ONCE
Status: COMPLETED | OUTPATIENT
Start: 2019-09-24 | End: 2019-09-24

## 2019-09-24 RX ORDER — DEXAMETHASONE 4 MG/1
10 TABLET ORAL ONCE
Status: COMPLETED | OUTPATIENT
Start: 2019-09-24 | End: 2019-09-24

## 2019-09-24 RX ORDER — PENICILLIN V POTASSIUM 500 MG/1
500 TABLET ORAL 2 TIMES DAILY
Qty: 20 TABLET | Refills: 0 | Status: SHIPPED | OUTPATIENT
Start: 2019-09-24 | End: 2019-10-04

## 2019-09-24 RX ADMIN — SODIUM CHLORIDE 1000 ML: 9 INJECTION, SOLUTION INTRAVENOUS at 22:20

## 2019-09-24 RX ADMIN — ACETAMINOPHEN 650 MG: 325 TABLET ORAL at 22:20

## 2019-09-24 RX ADMIN — DEXAMETHASONE 10 MG: 4 TABLET ORAL at 23:07

## 2019-09-24 RX ADMIN — PENICILLIN V POTASIUM 500 MG: 250 TABLET ORAL at 23:07

## 2019-09-24 ASSESSMENT — PAIN SCALES - GENERAL
PAINLEVEL_OUTOF10: 0
PAINLEVEL_OUTOF10: 6

## 2019-09-24 ASSESSMENT — PAIN DESCRIPTION - ORIENTATION: ORIENTATION: MID

## 2019-09-24 ASSESSMENT — PAIN DESCRIPTION - PAIN TYPE: TYPE: ACUTE PAIN

## 2019-09-24 ASSESSMENT — PAIN DESCRIPTION - FREQUENCY: FREQUENCY: INTERMITTENT

## 2019-09-24 ASSESSMENT — PAIN DESCRIPTION - DESCRIPTORS: DESCRIPTORS: PRESSURE

## 2019-09-24 ASSESSMENT — PAIN DESCRIPTION - LOCATION: LOCATION: CHEST

## 2019-09-25 LAB
EKG ATRIAL RATE: 103 BPM
EKG DIAGNOSIS: NORMAL
EKG P AXIS: 59 DEGREES
EKG P-R INTERVAL: 152 MS
EKG Q-T INTERVAL: 320 MS
EKG QRS DURATION: 82 MS
EKG QTC CALCULATION (BAZETT): 419 MS
EKG R AXIS: 22 DEGREES
EKG T AXIS: 37 DEGREES
EKG VENTRICULAR RATE: 103 BPM

## 2019-09-25 PROCEDURE — 93010 ELECTROCARDIOGRAM REPORT: CPT | Performed by: INTERNAL MEDICINE

## 2019-09-25 NOTE — ED PROVIDER NOTES
>60    GFR African American >60 >60    Calcium 9.7 8.3 - 10.6 mg/dL    Total Protein 7.5 6.4 - 8.2 g/dL    Alb 4.7 3.4 - 5.0 g/dL    Albumin/Globulin Ratio 1.7 1.1 - 2.2    Total Bilirubin 0.5 0.0 - 1.0 mg/dL    Alkaline Phosphatase 74 40 - 129 U/L    ALT 27 10 - 40 U/L    AST 23 15 - 37 U/L    Globulin 2.8 g/dL   Lactic Acid, Plasma   Result Value Ref Range    Lactic Acid 0.6 0.4 - 2.0 mmol/L   Troponin   Result Value Ref Range    Troponin <0.01 <0.01 ng/mL   Urinalysis   Result Value Ref Range    Color, UA Yellow Straw/Yellow    Clarity, UA Clear Clear    Glucose, Ur Negative Negative mg/dL    Bilirubin Urine SMALL (A) Negative    Ketones, Urine >=80 (AA) Negative mg/dL    Specific Gravity, UA 1.025 1.005 - 1.030    Blood, Urine TRACE-LYSED (A) Negative    pH, UA 6.5 5.0 - 8.0    Protein, UA TRACE (A) Negative mg/dL    Urobilinogen, Urine 0.2 <2.0 E.U./dL    Nitrite, Urine Negative Negative    Leukocyte Esterase, Urine Negative Negative    Microscopic Examination YES     Urine Type Not Specified    Microscopic Urinalysis   Result Value Ref Range    Mucus, UA 1+ (A) /LPF    WBC, UA 3-5 0 - 5 /HPF    RBC, UA 3-5 (A) 0 - 2 /HPF    Bacteria, UA Rare (A) /HPF   EKG 12 Lead   Result Value Ref Range    Ventricular Rate 103 BPM    Atrial Rate 103 BPM    P-R Interval 152 ms    QRS Duration 82 ms    Q-T Interval 320 ms    QTc Calculation (Bazett) 419 ms    P Axis 59 degrees    R Axis 22 degrees    T Axis 37 degrees    Diagnosis       Sinus tachycardiaOtherwise normal ECGNo previous ECGs available       XR CHEST STANDARD (2 VW)   Final Result   Normal chest.             CLINICAL IMPRESSION  1. Streptococcal sore throat        Blood pressure (!) 141/90, pulse 121, temperature 100 °F (37.8 °C), temperature source Oral, resp. rate 20, height 6' (1.829 m), weight 230 lb (104.3 kg), SpO2 98 %. DISPOSITION  Sunil Da Silva was discharged to home in stable condition.      This chart was generated in part by using Dragon

## 2019-10-17 ENCOUNTER — OFFICE VISIT (OUTPATIENT)
Dept: FAMILY MEDICINE CLINIC | Age: 44
End: 2019-10-17
Payer: MEDICAID

## 2019-10-17 VITALS
RESPIRATION RATE: 16 BRPM | HEIGHT: 72 IN | OXYGEN SATURATION: 97 % | BODY MASS INDEX: 32.1 KG/M2 | WEIGHT: 237 LBS | DIASTOLIC BLOOD PRESSURE: 86 MMHG | HEART RATE: 85 BPM | SYSTOLIC BLOOD PRESSURE: 108 MMHG

## 2019-10-17 DIAGNOSIS — K62.9 RECTAL DISORDER: ICD-10-CM

## 2019-10-17 DIAGNOSIS — Z23 NEEDS FLU SHOT: ICD-10-CM

## 2019-10-17 DIAGNOSIS — G47.33 OSA (OBSTRUCTIVE SLEEP APNEA): ICD-10-CM

## 2019-10-17 DIAGNOSIS — L30.9 HAND DERMATITIS: Primary | ICD-10-CM

## 2019-10-17 DIAGNOSIS — R51.9 HEADACHE DISORDER: ICD-10-CM

## 2019-10-17 DIAGNOSIS — M25.531 PAIN IN RIGHT WRIST: ICD-10-CM

## 2019-10-17 DIAGNOSIS — R33.9 UNABLE TO EMPTY BLADDER: ICD-10-CM

## 2019-10-17 DIAGNOSIS — Z87.09 HISTORY OF STREPTOCOCCAL SORE THROAT: ICD-10-CM

## 2019-10-17 DIAGNOSIS — K59.09 OTHER CONSTIPATION: ICD-10-CM

## 2019-10-17 DIAGNOSIS — M79.641 PAIN OF RIGHT HAND: ICD-10-CM

## 2019-10-17 DIAGNOSIS — B36.9 FUNGAL SKIN INFECTION: ICD-10-CM

## 2019-10-17 LAB
A/G RATIO: 2.8 (ref 1.1–2.2)
ALBUMIN SERPL-MCNC: 5.1 G/DL (ref 3.4–5)
ALP BLD-CCNC: 75 U/L (ref 40–129)
ALT SERPL-CCNC: 33 U/L (ref 10–40)
ANION GAP SERPL CALCULATED.3IONS-SCNC: 12 MMOL/L (ref 3–16)
AST SERPL-CCNC: 25 U/L (ref 15–37)
BILIRUB SERPL-MCNC: 0.3 MG/DL (ref 0–1)
BILIRUBIN URINE: NEGATIVE
BLOOD, URINE: NEGATIVE
BUN BLDV-MCNC: 15 MG/DL (ref 7–20)
CALCIUM SERPL-MCNC: 9.6 MG/DL (ref 8.3–10.6)
CHLORIDE BLD-SCNC: 108 MMOL/L (ref 99–110)
CLARITY: CLEAR
CO2: 27 MMOL/L (ref 21–32)
COLOR: YELLOW
CREAT SERPL-MCNC: 0.9 MG/DL (ref 0.9–1.3)
EPITHELIAL CELLS, UA: 0 /HPF (ref 0–5)
GFR AFRICAN AMERICAN: >60
GFR NON-AFRICAN AMERICAN: >60
GLOBULIN: 1.8 G/DL
GLUCOSE BLD-MCNC: 94 MG/DL (ref 70–99)
GLUCOSE URINE: NEGATIVE MG/DL
HCT VFR BLD CALC: 41 % (ref 40.5–52.5)
HEMOGLOBIN: 14.3 G/DL (ref 13.5–17.5)
HYALINE CASTS: 1 /LPF (ref 0–8)
KETONES, URINE: NEGATIVE MG/DL
LEUKOCYTE ESTERASE, URINE: NEGATIVE
MCH RBC QN AUTO: 29.4 PG (ref 26–34)
MCHC RBC AUTO-ENTMCNC: 34.9 G/DL (ref 31–36)
MCV RBC AUTO: 84 FL (ref 80–100)
MICROSCOPIC EXAMINATION: NORMAL
NITRITE, URINE: NEGATIVE
PDW BLD-RTO: 13 % (ref 12.4–15.4)
PH UA: 6.5 (ref 5–8)
PLATELET # BLD: 216 K/UL (ref 135–450)
PMV BLD AUTO: 8.3 FL (ref 5–10.5)
POTASSIUM SERPL-SCNC: 4.6 MMOL/L (ref 3.5–5.1)
PROTEIN UA: NEGATIVE MG/DL
RBC # BLD: 4.88 M/UL (ref 4.2–5.9)
RBC UA: 2 /HPF (ref 0–4)
SODIUM BLD-SCNC: 147 MMOL/L (ref 136–145)
SPECIFIC GRAVITY UA: 1.02 (ref 1–1.03)
TOTAL PROTEIN: 6.9 G/DL (ref 6.4–8.2)
URINE TYPE: NORMAL
UROBILINOGEN, URINE: 1 E.U./DL
WBC # BLD: 8.2 K/UL (ref 4–11)
WBC UA: 3 /HPF (ref 0–5)

## 2019-10-17 PROCEDURE — 4004F PT TOBACCO SCREEN RCVD TLK: CPT | Performed by: FAMILY MEDICINE

## 2019-10-17 PROCEDURE — 99215 OFFICE O/P EST HI 40 MIN: CPT | Performed by: FAMILY MEDICINE

## 2019-10-17 PROCEDURE — 36415 COLL VENOUS BLD VENIPUNCTURE: CPT | Performed by: FAMILY MEDICINE

## 2019-10-17 PROCEDURE — 90471 IMMUNIZATION ADMIN: CPT | Performed by: FAMILY MEDICINE

## 2019-10-17 PROCEDURE — G8417 CALC BMI ABV UP PARAM F/U: HCPCS | Performed by: FAMILY MEDICINE

## 2019-10-17 PROCEDURE — 90686 IIV4 VACC NO PRSV 0.5 ML IM: CPT | Performed by: FAMILY MEDICINE

## 2019-10-17 PROCEDURE — G8427 DOCREV CUR MEDS BY ELIG CLIN: HCPCS | Performed by: FAMILY MEDICINE

## 2019-10-17 PROCEDURE — G8482 FLU IMMUNIZE ORDER/ADMIN: HCPCS | Performed by: FAMILY MEDICINE

## 2019-10-17 RX ORDER — FLUTICASONE PROPIONATE 50 MCG
2 SPRAY, SUSPENSION (ML) NASAL DAILY
Qty: 1 BOTTLE | Refills: 5 | Status: SHIPPED | OUTPATIENT
Start: 2019-10-17 | End: 2020-11-06

## 2019-10-17 RX ORDER — CLOBETASOL PROPIONATE 0.5 MG/G
OINTMENT TOPICAL
Qty: 1 TUBE | Refills: 1 | Status: SHIPPED | OUTPATIENT
Start: 2019-10-17 | End: 2020-10-22

## 2019-10-17 RX ORDER — IBUPROFEN 800 MG/1
800 TABLET ORAL EVERY 8 HOURS PRN
Qty: 90 TABLET | Refills: 3 | Status: SHIPPED | OUTPATIENT
Start: 2019-10-17 | End: 2020-02-13

## 2019-10-17 RX ORDER — PRENATAL VIT 91/IRON/FOLIC/DHA 28-975-200
COMBINATION PACKAGE (EA) ORAL
Qty: 1 TUBE | Refills: 0 | Status: SHIPPED | OUTPATIENT
Start: 2019-10-17 | End: 2020-10-22

## 2019-10-17 ASSESSMENT — PATIENT HEALTH QUESTIONNAIRE - PHQ9
1. LITTLE INTEREST OR PLEASURE IN DOING THINGS: 0
2. FEELING DOWN, DEPRESSED OR HOPELESS: 0
SUM OF ALL RESPONSES TO PHQ QUESTIONS 1-9: 0
SUM OF ALL RESPONSES TO PHQ9 QUESTIONS 1 & 2: 0
SUM OF ALL RESPONSES TO PHQ QUESTIONS 1-9: 0

## 2019-10-18 ASSESSMENT — ENCOUNTER SYMPTOMS
COUGH: 0
COLOR CHANGE: 0
WHEEZING: 0
BLOOD IN STOOL: 0
CONSTIPATION: 1
DIARRHEA: 0
CHEST TIGHTNESS: 0
EYES NEGATIVE: 1
NAUSEA: 0
ABDOMINAL DISTENTION: 0
VOMITING: 0
ABDOMINAL PAIN: 0
SHORTNESS OF BREATH: 0

## 2019-10-19 LAB — URINE CULTURE, ROUTINE: NORMAL

## 2019-10-20 RX ORDER — TAMSULOSIN HYDROCHLORIDE 0.4 MG/1
0.4 CAPSULE ORAL DAILY
Qty: 90 CAPSULE | Refills: 1 | Status: SHIPPED | OUTPATIENT
Start: 2019-10-20 | End: 2020-04-13

## 2019-11-04 ENCOUNTER — OFFICE VISIT (OUTPATIENT)
Dept: SURGERY | Age: 44
End: 2019-11-04
Payer: MEDICAID

## 2019-11-04 VITALS
SYSTOLIC BLOOD PRESSURE: 128 MMHG | HEIGHT: 72 IN | WEIGHT: 242 LBS | HEART RATE: 87 BPM | BODY MASS INDEX: 32.78 KG/M2 | DIASTOLIC BLOOD PRESSURE: 90 MMHG

## 2019-11-04 DIAGNOSIS — K62.89 RECTAL DISCOMFORT: Primary | ICD-10-CM

## 2019-11-04 PROCEDURE — G8482 FLU IMMUNIZE ORDER/ADMIN: HCPCS | Performed by: SURGERY

## 2019-11-04 PROCEDURE — 99244 OFF/OP CNSLTJ NEW/EST MOD 40: CPT | Performed by: SURGERY

## 2019-11-04 PROCEDURE — G8427 DOCREV CUR MEDS BY ELIG CLIN: HCPCS | Performed by: SURGERY

## 2019-11-04 PROCEDURE — G8417 CALC BMI ABV UP PARAM F/U: HCPCS | Performed by: SURGERY

## 2019-11-04 RX ORDER — SENNA PLUS 8.6 MG/1
1 TABLET ORAL 2 TIMES DAILY
Qty: 60 TABLET | Refills: 11 | Status: SHIPPED | OUTPATIENT
Start: 2019-11-04 | End: 2020-10-22

## 2019-12-11 ENCOUNTER — HOSPITAL ENCOUNTER (OUTPATIENT)
Dept: PHYSICAL THERAPY | Age: 44
Setting detail: THERAPIES SERIES
Discharge: HOME | End: 2019-12-11
Payer: MEDICAID

## 2019-12-11 PROCEDURE — 97140 MANUAL THERAPY 1/> REGIONS: CPT

## 2019-12-11 PROCEDURE — 97163 PT EVAL HIGH COMPLEX 45 MIN: CPT

## 2019-12-11 PROCEDURE — 97530 THERAPEUTIC ACTIVITIES: CPT

## 2019-12-18 ENCOUNTER — APPOINTMENT (OUTPATIENT)
Dept: PHYSICAL THERAPY | Age: 44
End: 2019-12-18
Payer: MEDICAID

## 2020-01-08 ENCOUNTER — HOSPITAL ENCOUNTER (OUTPATIENT)
Dept: PHYSICAL THERAPY | Age: 45
Setting detail: THERAPIES SERIES
Discharge: HOME | End: 2020-01-08
Payer: MEDICAID

## 2020-01-08 PROCEDURE — 97110 THERAPEUTIC EXERCISES: CPT

## 2020-01-08 PROCEDURE — 97530 THERAPEUTIC ACTIVITIES: CPT

## 2020-01-08 NOTE — FLOWSHEET NOTE
Physical Therapy Daily Treatment Note    Date:  2020    Patient Name:  Lacey Ball    :  1975  MRN: [id number]  Restrictions/Precautions:  Universal   Medical/Treatment Diagnosis Information:  · Diagnosis: K62.89 Rectal discomfort  Insurance/Certification information:  PT Insurance Information: Alirio Edwin  Physician Information:  Referring Practitioner: Patrick Ramirez MD  Plan of care signed (Y/N): Y  Visit# / total visits:       G-Code (if applicable):          n/a    Medicare Cap (if applicable):  n/a = total amount used, updated 2020    Time in:  2:00pm    Timed Treatment: 55min. Total Treatment Time: 55min.  ________________________________________________________________________________________    Pain Level:    0/10  SUBJECTIVE: Patient states \"I do not want another exam, I will do exercise today\". Nothing new to note. OBJECTIVE:     Exercise (TE) Resistance/Repetitions Other comments            PF strengthening                                PF relaxation Belly breathing:x10         Hip ADD stretch:x10       Child's pose: x10     Cat to cow: x10       Happy Baby Pose:x10      Bridge     x10              Other Treatment:   TA:  Bladder re-training/education:     Diary: --    Defecation: patient reports he is having a \"normal\" BM at beginning of day, but still does not feel his bowels are empty, thus is digitizing to have a BM. Dietary: increasing fiber intake    Other: bowel routine, squatty potty, blowing through fist    Manual Treatments:  Patient refused    Modalities:  --    Test/Measurements: see eval            ASSESSMENT:   The patient responded well to above session. The patient is reporting \"normal\" evacuation of bowels in morning, though is still digitizing following this due to \"feeling like there is more in there\". Patient educated as above, patient verbalized good understanding of all plan.   The patient will implement plan and follow up in 2 weeks (per his

## 2020-01-13 ENCOUNTER — TELEPHONE (OUTPATIENT)
Dept: FAMILY MEDICINE CLINIC | Age: 45
End: 2020-01-13

## 2020-01-13 RX ORDER — CALCIUM POLYCARBOPHIL 625 MG
625 TABLET ORAL 2 TIMES DAILY
Qty: 60 TABLET | Refills: 5 | Status: SHIPPED | OUTPATIENT
Start: 2020-01-13 | End: 2021-06-21

## 2020-01-21 NOTE — PROGRESS NOTES
Registrar called patient to remind him of appt.  And he cancelled both scheduled appts due to having meetings at work and will call when he doesn't have meetings planned to reschedule

## 2020-01-22 ENCOUNTER — HOSPITAL ENCOUNTER (OUTPATIENT)
Dept: PHYSICAL THERAPY | Age: 45
Setting detail: THERAPIES SERIES
Discharge: HOME | End: 2020-01-22
Payer: MEDICAID

## 2020-02-13 RX ORDER — IBUPROFEN 800 MG/1
800 TABLET ORAL EVERY 8 HOURS PRN
Qty: 90 TABLET | Refills: 3 | Status: SHIPPED | OUTPATIENT
Start: 2020-02-13 | End: 2021-06-21

## 2020-03-11 NOTE — FLOWSHEET NOTE
Physical Therapy Discharge Summary    Date:  3/11/2020    Patient Name:  Pramod Borjas    :  1975  MRN: [id number]  Restrictions/Precautions:  Universal   Medical/Treatment Diagnosis Information:  · Diagnosis: K62.89 Rectal discomfort  Insurance/Certification information:  PT Insurance Information: Yamileth Frarell  Physician Information:  Referring Practitioner: Mimi Gutierrez MD  Plan of care signed (Y/N): Y  Visit# / total visits:  2 (19 to 20)     ________________________________________________________________________________________    Patient requested on 20 to cancel remaining appointments due to conflicting work schedule. The patient has not called back to reschedule, thus this note will serve as discharge summary. No goals met set at evaluation. OBJECTIVE:     Test/Measurements: see eval, no updated objective information. ASSESSMENT:   Patient discharged from [facility] at this time per his request.          Goals:    Time Frame: 8 weeks. Rehab Potential: fair to good     Goals:  1. Patient will demonstrate independence with HEP to self-manage symptoms. (NOT MET)  2. Patient will demonstrate non-caudal movement of pelvic floor during bearing down to demonstrate improved coordination. (NOT MET)  3. Patient will consistently report ability to produce bowel movement without digitation to improve quality of life. (NOT MET)  4. Patient will improve pelvic floor strength to 4/5 to improve overall functioning of pelvic floor.  (NOT MET)      Plan: [] Continue per plan of care [] Alter current plan (see comments)   [] Plan of care initiated [] Hold pending MD visit [x] Discharge       Signature:  Clinton Esparza, PT, DPT

## 2020-04-13 RX ORDER — TAMSULOSIN HYDROCHLORIDE 0.4 MG/1
CAPSULE ORAL
Qty: 30 CAPSULE | Refills: 5 | Status: SHIPPED | OUTPATIENT
Start: 2020-04-13 | End: 2020-09-28

## 2020-04-23 ENCOUNTER — VIRTUAL VISIT (OUTPATIENT)
Dept: FAMILY MEDICINE CLINIC | Age: 45
End: 2020-04-23
Payer: MEDICAID

## 2020-04-23 PROCEDURE — G8427 DOCREV CUR MEDS BY ELIG CLIN: HCPCS | Performed by: FAMILY MEDICINE

## 2020-04-23 PROCEDURE — 99213 OFFICE O/P EST LOW 20 MIN: CPT | Performed by: FAMILY MEDICINE

## 2020-04-23 RX ORDER — AMOXICILLIN 500 MG/1
500 CAPSULE ORAL 2 TIMES DAILY
Qty: 14 CAPSULE | Refills: 0 | Status: SHIPPED | OUTPATIENT
Start: 2020-04-23 | End: 2020-04-30

## 2020-04-23 ASSESSMENT — ENCOUNTER SYMPTOMS
WHEEZING: 0
RHINORRHEA: 0
CHEST TIGHTNESS: 0
TROUBLE SWALLOWING: 0
SHORTNESS OF BREATH: 0
SINUS PAIN: 0
SINUS PRESSURE: 0
COUGH: 1
SORE THROAT: 1

## 2020-04-23 NOTE — PROGRESS NOTES
GAVILAX powder TAKE 17 GRAMS BY MOUTH DAILY Yes Nathan Head MD   VITAMIN D-3 SUPER STRENGTH 2000 units TABS TAKE 1 TABLET BY MOUTH EVERY DAY Yes Nathan Head MD       Social History     Tobacco Use    Smoking status: Former Smoker     Packs/day: 0.50     Types: Cigars, Cigarettes     Last attempt to quit: 3/1/2020     Years since quittin.1    Smokeless tobacco: Former User     Quit date: 11/15/2015    Tobacco comment: 2 cigars/day previously   Substance Use Topics    Alcohol use: No     Alcohol/week: 0.0 standard drinks    Drug use: No            PHYSICAL EXAMINATION:  [ INSTRUCTIONS:  \"[x]\" Indicates a positive item  \"[]\" Indicates a negative item  -- DELETE ALL ITEMS NOT EXAMINED]    Constitutional: [x] Appears well-developed and well-nourished [x] No apparent distress      [] Abnormal-   Mental status  [x] Alert and awake  [x] Oriented to person/place/time [x]Able to follow commands      Eyes:  EOM    [x]  Normal  [] Abnormal-  Sclera  [x]  Normal  [] Abnormal -         Discharge [x]  None visible  [] Abnormal -    HENT:   [x] Normocephalic, atraumatic. [] Abnormal   [] Mouth/Throat: Mucous membranes are moist.     External Ears [x] Normal  [] Abnormal-     Neck: [] No visualized mass     Pulmonary/Chest: [x] Respiratory effort normal.  [x] No visualized signs of difficulty breathing or respiratory distress        [] Abnormal-      Musculoskeletal:   [] Normal gait with no signs of ataxia         [x] Normal range of motion of neck        [] Abnormal-       Neurological:        [x] No Facial Asymmetry (Cranial nerve 7 motor function) (limited exam to video visit)          [x] No gaze palsy        [] Abnormal-         Skin:        [x] No significant exanthematous lesions or discoloration noted on facial skin         [] Abnormal-            Psychiatric:       [x] Normal Affect [] No Hallucinations        [] Abnormal-     Other pertinent observable physical exam findings-     ASSESSMENT/PLAN:  1.

## 2020-06-10 ENCOUNTER — OFFICE VISIT (OUTPATIENT)
Dept: FAMILY MEDICINE CLINIC | Age: 45
End: 2020-06-10
Payer: MEDICAID

## 2020-06-10 VITALS
TEMPERATURE: 97.8 F | WEIGHT: 240 LBS | RESPIRATION RATE: 16 BRPM | OXYGEN SATURATION: 96 % | DIASTOLIC BLOOD PRESSURE: 78 MMHG | HEIGHT: 72 IN | BODY MASS INDEX: 32.51 KG/M2 | HEART RATE: 77 BPM | SYSTOLIC BLOOD PRESSURE: 110 MMHG

## 2020-06-10 LAB
A/G RATIO: 1.8 (ref 1.1–2.2)
ALBUMIN SERPL-MCNC: 4.2 G/DL (ref 3.4–5)
ALP BLD-CCNC: 68 U/L (ref 40–129)
ALT SERPL-CCNC: 27 U/L (ref 10–40)
ANION GAP SERPL CALCULATED.3IONS-SCNC: 10 MMOL/L (ref 3–16)
AST SERPL-CCNC: 20 U/L (ref 15–37)
BILIRUB SERPL-MCNC: <0.2 MG/DL (ref 0–1)
BILIRUBIN URINE: NEGATIVE
BLOOD, URINE: NEGATIVE
BUN BLDV-MCNC: 14 MG/DL (ref 7–20)
CALCIUM SERPL-MCNC: 9.1 MG/DL (ref 8.3–10.6)
CHLORIDE BLD-SCNC: 106 MMOL/L (ref 99–110)
CHOLESTEROL, TOTAL: 205 MG/DL (ref 0–199)
CLARITY: CLEAR
CO2: 24 MMOL/L (ref 21–32)
COLOR: YELLOW
CREAT SERPL-MCNC: 0.8 MG/DL (ref 0.9–1.3)
EPITHELIAL CELLS, UA: 0 /HPF (ref 0–5)
GFR AFRICAN AMERICAN: >60
GFR NON-AFRICAN AMERICAN: >60
GLOBULIN: 2.3 G/DL
GLUCOSE BLD-MCNC: 111 MG/DL (ref 70–99)
GLUCOSE URINE: NEGATIVE MG/DL
HDLC SERPL-MCNC: 27 MG/DL (ref 40–60)
HYALINE CASTS: 1 /LPF (ref 0–8)
KETONES, URINE: NEGATIVE MG/DL
LDL CHOLESTEROL CALCULATED: 131 MG/DL
LEUKOCYTE ESTERASE, URINE: NEGATIVE
MICROSCOPIC EXAMINATION: NORMAL
NITRITE, URINE: NEGATIVE
PH UA: 5.5 (ref 5–8)
POTASSIUM SERPL-SCNC: 4.3 MMOL/L (ref 3.5–5.1)
PROTEIN UA: NEGATIVE MG/DL
RBC UA: 3 /HPF (ref 0–4)
SODIUM BLD-SCNC: 140 MMOL/L (ref 136–145)
SPECIFIC GRAVITY UA: 1.03 (ref 1–1.03)
TOTAL PROTEIN: 6.5 G/DL (ref 6.4–8.2)
TRIGL SERPL-MCNC: 234 MG/DL (ref 0–150)
URINE TYPE: NORMAL
UROBILINOGEN, URINE: 0.2 E.U./DL
VLDLC SERPL CALC-MCNC: 47 MG/DL
WBC UA: 2 /HPF (ref 0–5)

## 2020-06-10 PROCEDURE — 99396 PREV VISIT EST AGE 40-64: CPT | Performed by: FAMILY MEDICINE

## 2020-06-10 PROCEDURE — 36415 COLL VENOUS BLD VENIPUNCTURE: CPT | Performed by: FAMILY MEDICINE

## 2020-06-10 ASSESSMENT — ENCOUNTER SYMPTOMS
SHORTNESS OF BREATH: 0
CHEST TIGHTNESS: 0
WHEEZING: 0
ABDOMINAL DISTENTION: 0
ABDOMINAL PAIN: 0
BLOOD IN STOOL: 0
COUGH: 0
COLOR CHANGE: 0
DIARRHEA: 0
NAUSEA: 0
CONSTIPATION: 0
VOMITING: 0
EYES NEGATIVE: 1

## 2020-06-10 ASSESSMENT — PATIENT HEALTH QUESTIONNAIRE - PHQ9
1. LITTLE INTEREST OR PLEASURE IN DOING THINGS: 0
SUM OF ALL RESPONSES TO PHQ9 QUESTIONS 1 & 2: 0
SUM OF ALL RESPONSES TO PHQ QUESTIONS 1-9: 0
2. FEELING DOWN, DEPRESSED OR HOPELESS: 0
SUM OF ALL RESPONSES TO PHQ QUESTIONS 1-9: 0

## 2020-06-23 ENCOUNTER — TELEPHONE (OUTPATIENT)
Dept: FAMILY MEDICINE CLINIC | Age: 45
End: 2020-06-23

## 2020-07-11 ENCOUNTER — PATIENT MESSAGE (OUTPATIENT)
Dept: FAMILY MEDICINE CLINIC | Age: 45
End: 2020-07-11

## 2020-07-13 RX ORDER — MULTIVITAMIN,THERAPEUTIC
1 TABLET ORAL DAILY
Qty: 30 TABLET | Refills: 12 | Status: SHIPPED | OUTPATIENT
Start: 2020-07-13 | End: 2021-08-25

## 2020-07-20 RX ORDER — CHOLECALCIFEROL (VITAMIN D3) 50 MCG
TABLET ORAL
Qty: 30 TABLET | Refills: 12 | Status: SHIPPED | OUTPATIENT
Start: 2020-07-20 | End: 2021-08-05

## 2020-07-20 NOTE — TELEPHONE ENCOUNTER
Refill Request     Last Seen: Visit date not found    Last Written: 7/17/19    Next Appointment:   Future Appointments   Date Time Provider Roberto Crawford   10/21/2020  9:00 AM Jordan Vaughn MD [facility]             Requested Prescriptions     Pending Prescriptions Disp Refills    Cholecalciferol (VITAMIN D-3 SUPER STRENGTH) 50 MCG (2000 UT) TABS 30 tablet 12

## 2020-10-22 ENCOUNTER — OFFICE VISIT (OUTPATIENT)
Dept: FAMILY MEDICINE CLINIC | Age: 45
End: 2020-10-22
Payer: MEDICAID

## 2020-10-22 VITALS
WEIGHT: 238.2 LBS | BODY MASS INDEX: 32.31 KG/M2 | OXYGEN SATURATION: 97 % | DIASTOLIC BLOOD PRESSURE: 82 MMHG | SYSTOLIC BLOOD PRESSURE: 118 MMHG | HEART RATE: 77 BPM | TEMPERATURE: 98 F

## 2020-10-22 LAB
A/G RATIO: 1.8 (ref 1.1–2.2)
ALBUMIN SERPL-MCNC: 4.6 G/DL (ref 3.4–5)
ALP BLD-CCNC: 81 U/L (ref 40–129)
ALT SERPL-CCNC: 27 U/L (ref 10–40)
ANION GAP SERPL CALCULATED.3IONS-SCNC: 10 MMOL/L (ref 3–16)
AST SERPL-CCNC: 21 U/L (ref 15–37)
BILIRUB SERPL-MCNC: 0.5 MG/DL (ref 0–1)
BUN BLDV-MCNC: 11 MG/DL (ref 7–20)
CALCIUM SERPL-MCNC: 9.4 MG/DL (ref 8.3–10.6)
CHLORIDE BLD-SCNC: 103 MMOL/L (ref 99–110)
CHOLESTEROL, TOTAL: 210 MG/DL (ref 0–199)
CO2: 28 MMOL/L (ref 21–32)
CREAT SERPL-MCNC: 0.7 MG/DL (ref 0.9–1.3)
GFR AFRICAN AMERICAN: >60
GFR NON-AFRICAN AMERICAN: >60
GLOBULIN: 2.5 G/DL
GLUCOSE BLD-MCNC: 105 MG/DL (ref 70–99)
HDLC SERPL-MCNC: 30 MG/DL (ref 40–60)
LDL CHOLESTEROL CALCULATED: 137 MG/DL
POTASSIUM SERPL-SCNC: 4.9 MMOL/L (ref 3.5–5.1)
SODIUM BLD-SCNC: 141 MMOL/L (ref 136–145)
TOTAL PROTEIN: 7.1 G/DL (ref 6.4–8.2)
TRIGL SERPL-MCNC: 213 MG/DL (ref 0–150)
VLDLC SERPL CALC-MCNC: 43 MG/DL

## 2020-10-22 PROCEDURE — 36415 COLL VENOUS BLD VENIPUNCTURE: CPT | Performed by: FAMILY MEDICINE

## 2020-10-22 PROCEDURE — G8482 FLU IMMUNIZE ORDER/ADMIN: HCPCS | Performed by: FAMILY MEDICINE

## 2020-10-22 PROCEDURE — 90471 IMMUNIZATION ADMIN: CPT | Performed by: FAMILY MEDICINE

## 2020-10-22 PROCEDURE — G8427 DOCREV CUR MEDS BY ELIG CLIN: HCPCS | Performed by: FAMILY MEDICINE

## 2020-10-22 PROCEDURE — G8417 CALC BMI ABV UP PARAM F/U: HCPCS | Performed by: FAMILY MEDICINE

## 2020-10-22 PROCEDURE — 90686 IIV4 VACC NO PRSV 0.5 ML IM: CPT | Performed by: FAMILY MEDICINE

## 2020-10-22 PROCEDURE — 1036F TOBACCO NON-USER: CPT | Performed by: FAMILY MEDICINE

## 2020-10-22 PROCEDURE — 99214 OFFICE O/P EST MOD 30 MIN: CPT | Performed by: FAMILY MEDICINE

## 2020-10-22 PROCEDURE — 69210 REMOVE IMPACTED EAR WAX UNI: CPT | Performed by: FAMILY MEDICINE

## 2020-10-22 RX ORDER — FEXOFENADINE HCL 180 MG/1
180 TABLET ORAL DAILY
Qty: 30 TABLET | Refills: 0 | Status: SHIPPED | OUTPATIENT
Start: 2020-10-22 | End: 2020-11-21

## 2020-10-22 ASSESSMENT — ENCOUNTER SYMPTOMS: COUGH: 0

## 2020-10-22 NOTE — PROGRESS NOTES
Vaccine Information Sheet, \"Influenza - Inactivated\"  given to [employer], or parent/legal guardian of  [place] and verbalized understanding. Patient responses:    Have you ever had a reaction to a flu vaccine? No  Do you have any current illness? No  Have you ever had Guillian [place] Syndrome? No  Do you have a serious allergy to any of the follow: Neomycin, Polymyxin, Thimerosal, eggs or egg products? No    Flu vaccine given per order. Please see immunization tab. Risks and benefits explained. Current VIS given.       Immunizations Administered     Name Date Dose Route    Influenza, Quadv, IM, PF (6 mo and older Fluzone, Flulaval, Fluarix, and 3 yrs and older Afluria) 10/22/2020 0.5 mL Intramuscular    Site: Deltoid- Right    Lot: [id number]    NDC: 97628-354-80

## 2020-10-22 NOTE — PROGRESS NOTES
10/22/2020     Loni Rocha (:  1975)is a 38 y.o. male, here for evaluation of the following medical concerns:    CC: HLD    HPI     HLD  T chol was 205 on 6/10/20, not currently on any meds    Post nasal drip  Chronic, not on any medications    Dry skin  Located, just inside external ear canal    obesity  BMI of 32    Review of Systems   Constitutional: Negative for fever. Respiratory: Negative for cough. Prior to Visit Medications    Medication Sig Taking? Authorizing Provider   fexofenadine (ALLEGRA) 180 MG tablet Take 1 tablet by mouth daily Yes Donnell Lucas MD   tamsulosin (FLOMAX) 0.4 MG capsule TAKE 1 CAPSULE BY MOUTH EVERY DAY Yes Genet Chester MD   Cholecalciferol (VITAMIN D-3 SUPER STRENGTH) 50 MCG (2000 UT) TABS TAKE 1 TABLET BY MOUTH EVERY DAY Yes Genet Chester MD   Multiple Vitamin (THERA/BETA-CAROTENE) TABS Take 1 tablet by mouth daily Yes Genet Chester MD   ibuprofen (ADVIL;MOTRIN) 800 MG tablet TAKE 1 TABLET BY MOUTH EVERY 8 HOURS AS NEEDED FOR PAIN Yes Genet Chester MD   Calcium Polycarbophil (FIBER) 625 MG TABS Take 1 tablet by mouth 2 times daily Yes Genet Chester MD   fluticasone (FLONASE) 50 MCG/ACT nasal spray 2 sprays by Each Nostril route daily Yes Genet Chester MD   GAVILAX powder TAKE 17 GRAMS BY MOUTH DAILY Yes Genet Chester MD        Social History     Tobacco Use    Smoking status: Former Smoker     Packs/day: 0.50     Types: Cigars, Cigarettes     Last attempt to quit: 3/1/2020     Years since quittin.6    Smokeless tobacco: Former User     Quit date: 11/15/2015    Tobacco comment: 2 cigars/day previously   Substance Use Topics    Alcohol use: No     Alcohol/week: 0.0 standard drinks        Vitals:    10/22/20 0908   BP: 118/82   Pulse: 77   Temp: 98 °F (36.7 °C)   SpO2: 97%   Weight: 238 lb 3.2 oz (108 kg)     Estimated body mass index is 32.31 kg/m² as calculated from the following:    Height as of 6/10/20: 6' (1.829 m).     Weight as of this encounter: 238 lb 3.2 oz (108 kg). Physical Exam  Constitutional:  Appears well-developed and well-nourished. No apparent distress    Mental status: Alert and awake. Able to follow commands  Eyes: normal EOM, normal sclera, no discharge visible  HENT: Normocephalic, atraumatic, bilateral ceruminosis obscuring view of TM's,  Dry skin just inside external ear canal bilaterally  External Ears: Normal  Neck: No visualized mass   Pulmonary/Chest:  Respiratory effort normal. No visualized signs of difficulty breathing or respiratory distress  Musculoskeletal: Normal range of motion of neck  Neurological: No Facial Asymmetry  Skin: No significant exanthematous lesions or discoloration noted on facial skin    ASSESSMENT/PLAN:  Salome Loera was seen today for medication refill. Diagnoses and all orders for this visit:    Hyperlipidemia, unspecified hyperlipidemia type  -     Lipid Panel    Post-nasal drip  -     fexofenadine (ALLEGRA) 180 MG tablet; Take 1 tablet by mouth daily    Xerosis of skin  Lotion PRN    Obesity, unspecified classification, unspecified obesity type, unspecified whether serious comorbidity present  The patient is asked to make an attempt to improve diet and exercise patterns to aid in medical management of this problem. Ceruminosis, bilateral  Curette was used by myself to remove cerumen from R ear canal in office on today's visit, irrigation used for L    Healthcare maintenance  -     Hemoglobin A1C  -     Comprehensive Metabolic Panel  -     INFLUENZA, QUADV, 3 YRS AND OLDER, IM PF, PREFILL SYR OR SDV, 0.5ML (AFLURIA QUADV, PF)    Return in about 6 months (around 4/22/2021). An electronic signature was used to authenticate this note.     --Lexa Balderas MD on 10/22/2020 at 10:10 AM

## 2020-10-23 ENCOUNTER — PATIENT MESSAGE (OUTPATIENT)
Dept: FAMILY MEDICINE CLINIC | Age: 45
End: 2020-10-23

## 2020-10-23 ENCOUNTER — TELEPHONE (OUTPATIENT)
Dept: ADMINISTRATIVE | Age: 45
End: 2020-10-23

## 2020-10-23 LAB
ESTIMATED AVERAGE GLUCOSE: 108.3 MG/DL
HBA1C MFR BLD: 5.4 %

## 2020-10-23 NOTE — TELEPHONE ENCOUNTER
Submitted PA for Fexofenadine HCl 180MG tablets, Key: [id number]. Mediation has been APPROVED through 10/24/2021. Letter attached. Please notify patient. Thank you.

## 2020-10-26 NOTE — TELEPHONE ENCOUNTER
From: Don Barrientos  To: Tiffany Rosales MD  Sent: 10/23/2020 6:54 PM EDT  Subject: Test Results Question    Hi froylan wondering about my test results I just want to check if everything was ok thank you

## 2020-10-26 NOTE — TELEPHONE ENCOUNTER
Labs are under Dr Arely Humphrey, but pt is yours. Did not know if you wanted to send pt back a message about results.

## 2020-12-21 RX ORDER — SENNOSIDES 8.6 MG
TABLET ORAL
Qty: 60 TABLET | Refills: 11 | Status: SHIPPED | OUTPATIENT
Start: 2020-12-21 | End: 2021-06-21

## 2021-06-04 ENCOUNTER — TELEPHONE (OUTPATIENT)
Dept: FAMILY MEDICINE CLINIC | Age: 46
End: 2021-06-04

## 2021-06-04 NOTE — TELEPHONE ENCOUNTER
Patient's wife is calling because patient is having a lot of trouble with his ears. He can not sleep and is now having trouble hearing. She said he needs them flushed. He is at work 3:00 and needs something after that time. She is requesting a return call. Please advise.

## 2021-06-04 NOTE — TELEPHONE ENCOUNTER
Pt wife notified.  She will call back to schedule appt next week with an NP if pt does not want to go to urgent care

## 2021-06-07 ENCOUNTER — HOSPITAL ENCOUNTER (EMERGENCY)
Age: 46
Discharge: HOME | End: 2021-06-07
Payer: MEDICAID

## 2021-06-07 VITALS
WEIGHT: 237 LBS | OXYGEN SATURATION: 97 % | SYSTOLIC BLOOD PRESSURE: 145 MMHG | TEMPERATURE: 99.5 F | RESPIRATION RATE: 16 BRPM | HEART RATE: 101 BPM | HEIGHT: 72 IN | BODY MASS INDEX: 32.1 KG/M2 | DIASTOLIC BLOOD PRESSURE: 99 MMHG

## 2021-06-07 DIAGNOSIS — H60.393 INFECTIVE OTITIS EXTERNA OF BOTH EARS: Primary | ICD-10-CM

## 2021-06-07 PROCEDURE — 6370000000 HC RX 637 (ALT 250 FOR IP): Performed by: PHYSICIAN ASSISTANT

## 2021-06-07 PROCEDURE — 99284 EMERGENCY DEPT VISIT MOD MDM: CPT

## 2021-06-07 RX ORDER — AMOXICILLIN AND CLAVULANATE POTASSIUM 875; 125 MG/1; MG/1
1 TABLET, FILM COATED ORAL ONCE
Status: COMPLETED | OUTPATIENT
Start: 2021-06-07 | End: 2021-06-07

## 2021-06-07 RX ORDER — AMOXICILLIN AND CLAVULANATE POTASSIUM 875; 125 MG/1; MG/1
1 TABLET, FILM COATED ORAL 2 TIMES DAILY
Qty: 20 TABLET | Refills: 0 | Status: SHIPPED | OUTPATIENT
Start: 2021-06-07 | End: 2021-06-17

## 2021-06-07 RX ORDER — TRAMADOL HYDROCHLORIDE 50 MG/1
50 TABLET ORAL EVERY 6 HOURS PRN
Qty: 10 TABLET | Refills: 0 | Status: SHIPPED | OUTPATIENT
Start: 2021-06-07 | End: 2021-06-10

## 2021-06-07 RX ORDER — AMOXICILLIN AND CLAVULANATE POTASSIUM 875; 125 MG/1; MG/1
1 TABLET, FILM COATED ORAL 2 TIMES DAILY
Qty: 20 TABLET | Refills: 0 | Status: SHIPPED | OUTPATIENT
Start: 2021-06-07 | End: 2021-06-07 | Stop reason: SDUPTHER

## 2021-06-07 RX ADMIN — AMOXICILLIN AND CLAVULANATE POTASSIUM 1 TABLET: 875; 125 TABLET, FILM COATED ORAL at 13:39

## 2021-06-07 ASSESSMENT — PAIN DESCRIPTION - ONSET: ONSET: ON-GOING

## 2021-06-07 ASSESSMENT — PAIN DESCRIPTION - PAIN TYPE: TYPE: ACUTE PAIN;CHRONIC PAIN

## 2021-06-07 ASSESSMENT — PAIN DESCRIPTION - ORIENTATION: ORIENTATION: RIGHT

## 2021-06-07 ASSESSMENT — PAIN SCALES - GENERAL: PAINLEVEL_OUTOF10: 5

## 2021-06-07 ASSESSMENT — PAIN DESCRIPTION - LOCATION: LOCATION: EAR

## 2021-06-07 ASSESSMENT — PAIN DESCRIPTION - FREQUENCY: FREQUENCY: CONTINUOUS

## 2021-06-07 NOTE — ED NOTES
Educated pt on x2 prescriptions and discharge paperwork as well as follow-up appointment. Pt verbalizes understanding of all instructions and denies questions. Pt left ambulatory by self with all personal belongings, prescriptions x1, and discharge paperwork to private residence. Pt in no distress at this time. Prescriptions x1 sent as E-Scripts. Pt instructed on how to  prescriptions. Pt  verbalizes understanding.        Farrukh Molina RN  06/07/21 [id number]

## 2021-06-07 NOTE — LETTER
[facility]  ED  [address] [address]  Phone: [phone]  Fax: [phone]             June 7, 2021    Patient: Luis Angel Schroeder   YOB: 1975   Date of Visit: 6/7/2021       To Whom It May Concern:    Luis Angel Schroeder was seen and treated in our emergency department on 6/7/2021. He may return to work on 06/09/2021.       Sincerely,             Signature:__________________________________

## 2021-06-07 NOTE — ED PROVIDER NOTES
[facility] Emergency Department    CHIEF COMPLAINT  Otalgia (R ear pain for the past week. States has chronic problems with bilateral ears. Was told that he had an ear infection on Friday by urgent care.  )      SHARED SERVICE VISIT:  Evaluated by ANDREWS. My supervising physician was available for consultation. HISTORY OF PRESENT ILLNESS  Ruby Wyatt is a 54 y.o. male who presents to the ED complaining of a several day history of ongoing ear pain. Patient states that he gets cerumen impactions and typically gets them cleaned out every 6 months to a year. States that he went to have them cleared out last week and was informed that the right external ear appeared infected. He was prescribed drops which she has been using. Reports that despite pain has been worsening. States that he is now believes he has some involvement of the left ear. He denies any recent swimming. No history of diabetes. Denies fevers chills. No nasal congestion sore throat. No cough congestion. No headache, lightheadedness, dizziness confusion. No other complaints, modifying factors or associated symptoms. Nursing notes reviewed.    Past Medical History:   Diagnosis Date    HLD (hyperlipidemia)     IFG (impaired fasting glucose)     PRACHI (obstructive sleep apnea)     nasal pillow cpap, Dr Becky Nicole     Past Surgical History:   Procedure Laterality Date    CYST REMOVAL N/A 2016    3 cysts on scalp    HEMORRHOID SURGERY      HERNIA REPAIR      B/L inguinal     Family History   Problem Relation Age of Onset    Hypertension Mother     Other Father         Chronic kidney infx,      Social History     Socioeconomic History    Marital status:      Spouse name: Not on file    Number of children: Not on file    Years of education: Not on file    Highest education level: Not on file   Occupational History    Not on file   Tobacco Use    Smoking status: Former Smoker     Packs/day: 0.10     Types: Cigars     Quit date: 3/1/2020     Years since quittin.2    Smokeless tobacco: Former User     Quit date: 11/15/2015    Tobacco comment: states 1 cigar a week when fishing   Vaping Use    Vaping Use: Never used   Substance and Sexual Activity    Alcohol use: No     Alcohol/week: 0.0 standard drinks    Drug use: No    Sexual activity: Not on file   Other Topics Concern    Not on file   Social History Narrative    Not on file     Social Determinants of Health     Financial Resource Strain:     Difficulty of Paying Living Expenses:    Food Insecurity:     Worried About Running Out of Food in the Last Year:     Ran Out of Food in the Last Year:    Transportation Needs:     Lack of Transportation (Medical):  Lack of Transportation (Non-Medical):    Physical Activity:     Days of Exercise per Week:     Minutes of Exercise per Session:    Stress:     Feeling of Stress :    Social Connections:     Frequency of Communication with Friends and Family:     Frequency of Social Gatherings with Friends and Family:     Attends Buddhist Services:     Active Member of Clubs or Organizations:     Attends Club or Organization Meetings:     Marital Status:    Intimate Partner Violence:     Fear of Current or Ex-Partner:     Emotionally Abused:     Physically Abused:     Sexually Abused:      No current facility-administered medications for this encounter. Current Outpatient Medications   Medication Sig Dispense Refill    traMADol (ULTRAM) 50 MG tablet Take 1 tablet by mouth every 6 hours as needed for Pain for up to 3 days.  10 tablet 0    amoxicillin-clavulanate (AUGMENTIN) 875-125 MG per tablet Take 1 tablet by mouth 2 times daily for 10 days 20 tablet 0    tamsulosin (FLOMAX) 0.4 MG capsule TAKE 1 CAPSULE BY MOUTH EVERY DAY 30 capsule 12    senna (SENOKOT) 8.6 MG TABS tablet TAKE 1 TABLET BY MOUTH TWICE A DAY 60 tablet 11    fluticasone (FLONASE) 50 MCG/ACT nasal spray SPRAY 2 SPRAYS INTO EACH NOSTRIL EVERY DAY 1 Bottle 5    clobetasol (TEMOVATE) 0.05 % ointment APPLY TO AFFECTED AREA TWICE A DAY 15 g 1    Cholecalciferol (VITAMIN D-3 SUPER STRENGTH) 50 MCG (2000 UT) TABS TAKE 1 TABLET BY MOUTH EVERY DAY 30 tablet 12    Multiple Vitamin (THERA/BETA-CAROTENE) TABS Take 1 tablet by mouth daily 30 tablet 12    ibuprofen (ADVIL;MOTRIN) 800 MG tablet TAKE 1 TABLET BY MOUTH EVERY 8 HOURS AS NEEDED FOR PAIN 90 tablet 3    Calcium Polycarbophil (FIBER) 625 MG TABS Take 1 tablet by mouth 2 times daily 60 tablet 5    GAVILAX powder TAKE 17 GRAMS BY MOUTH DAILY 510 g 3     No Known Allergies    REVIEW OF SYSTEMS  6 systems reviewed, pertinent positives per HPI otherwise noted to be negative    PHYSICAL EXAM  BP (!) 145/99   Pulse 101   Temp 99.5 °F (37.5 °C) (Oral)   Resp 16   Ht 6' (1.829 m)   Wt 237 lb (107.5 kg)   SpO2 97%   BMI 32.14 kg/m²   GENERAL APPEARANCE: Awake and alert. Cooperative. No acute distress. HEAD: Normocephalic. Atraumatic. EYES: PERRL. EOM's grossly intact. ENT: Mucous membranes are moist. No pain with manipulation of external ear. No mastoid tenderness. Left ear canal edematous with drainage. Left as well although does not appear as pronounced as on the right. Difficult to evaluate TM on right secondary to swelling. LYMPH: No periauricular, submental, or cervical lymphadenopathy. NECK: Supple. Normal ROM. CHEST: Equal symmetric chest rise. LUNGS: Breathing is unlabored. Speaking comfortably in full sentences. Abdomen: Nondistended  EXTREMITIES: MAEE. No acute deformities. SKIN: Warm and dry. NEUROLOGICAL: Alert and oriented. Strength is 5/5 in all extremities and sensation is intact. ED COURSE  Pain control was not required while here in the emergency department. Triage vital stable. Patient will continue to use otic drops. Will also prescribe oral antibiotics.  Patient recommended to follow-up with PCP as well as ENT for further evaluation more definitive care. Of also discussed return precautions and patient in agreement and comfortable at discharge. A discussion was had with Mr. Gilmer Naylor regarding ear pain, ED findings and recommendations for follow-up. Risk management discussed and shared decision making had with patient and/or surrogate. All questions were answered. Patient will follow up with PCP within 2 to 3 days and ENT within 1 week for further evaluation/treatment. Patient will return to ED for new/worsening symptoms. Patient was sent home with a prescription for Augmentin and Ultram and informed to continue medications as prescribed. MDM  I estimate there is LOW risk for a ANAPHYLAXIS, DEEP SPACE INFECTION (e.g., AIMEES ANGINA OR RETROPHARYNGEAL ABSCESS), EPIGLOTTITIS, MENINGITIS, or AIRWAY COMPROMISE, thus I consider the discharge disposition reasonable. Also, there is no evidence or peritonitis, sepsis, or toxicity. Humberto Jackson and I have discussed the diagnosis and risks, and we agree with discharging home to follow-up with their primary doctor. We also discussed returning to the Emergency Department immediately if new or worsening symptoms occur. We have discussed the symptoms which are most concerning (e.g., changing or worsening pain, trouble swallowing or breathing, neck stiffness or fever) that necessitate immediate return. Final Impression  1. Infective otitis externa of both ears      Discharge Vital Signs:  Blood pressure (!) 145/99, pulse 101, temperature 99.5 °F (37.5 °C), temperature source Oral, resp. rate 16, height 6' (1.829 m), weight 237 lb (107.5 kg), SpO2 97 %. DISPOSITION  Patient was discharged to home in good condition.             [place], [place]  06/07/21 [id number]

## 2021-06-08 ENCOUNTER — TELEPHONE (OUTPATIENT)
Dept: ENT CLINIC | Age: 46
End: 2021-06-08

## 2021-06-08 NOTE — TELEPHONE ENCOUNTER
----- Message from Daquan Holm DO sent at 6/8/2021 10:46 AM EDT -----    ----- Message -----  From: Automatic Discharge Provider  Sent: 6/7/2021   1:51 PM EDT  To: Daquan Holm DO

## 2021-06-11 ENCOUNTER — OFFICE VISIT (OUTPATIENT)
Dept: ENT CLINIC | Age: 46
End: 2021-06-11
Payer: MEDICAID

## 2021-06-11 VITALS
SYSTOLIC BLOOD PRESSURE: 124 MMHG | TEMPERATURE: 97.5 F | DIASTOLIC BLOOD PRESSURE: 74 MMHG | HEART RATE: 88 BPM | WEIGHT: 242.6 LBS | HEIGHT: 72 IN | BODY MASS INDEX: 32.86 KG/M2

## 2021-06-11 DIAGNOSIS — H61.21 IMPACTED CERUMEN OF RIGHT EAR: ICD-10-CM

## 2021-06-11 DIAGNOSIS — H60.393 OTHER INFECTIVE ACUTE OTITIS EXTERNA OF BOTH EARS: Primary | ICD-10-CM

## 2021-06-11 PROCEDURE — 1036F TOBACCO NON-USER: CPT | Performed by: OTOLARYNGOLOGY

## 2021-06-11 PROCEDURE — 4130F TOPICAL PREP RX AOE: CPT | Performed by: OTOLARYNGOLOGY

## 2021-06-11 PROCEDURE — 69210 REMOVE IMPACTED EAR WAX UNI: CPT | Performed by: OTOLARYNGOLOGY

## 2021-06-11 PROCEDURE — 99203 OFFICE O/P NEW LOW 30 MIN: CPT | Performed by: OTOLARYNGOLOGY

## 2021-06-11 PROCEDURE — G8427 DOCREV CUR MEDS BY ELIG CLIN: HCPCS | Performed by: OTOLARYNGOLOGY

## 2021-06-11 PROCEDURE — G8417 CALC BMI ABV UP PARAM F/U: HCPCS | Performed by: OTOLARYNGOLOGY

## 2021-06-11 ASSESSMENT — ENCOUNTER SYMPTOMS
VOICE CHANGE: 0
EYE ITCHING: 0
FACIAL SWELLING: 0
TROUBLE SWALLOWING: 0
SINUS PRESSURE: 0
SHORTNESS OF BREATH: 0
COUGH: 0
SORE THROAT: 0
APNEA: 0

## 2021-06-11 NOTE — PROGRESS NOTES
Rika [address], [address]  [place], [address]  P: [phone]       Patient     Ana Rodríguez  1975    ChiefComplaint     Chief Complaint   Patient presents with    Ear Problem     Last friday went to urgent care to have ears washed, was told had ear infection in right ear. Went to ED and was given antibiotics. States ears a little bit better but has pain       History of Present Illness     Lani Roca is a 54year old male here today for evaluation ff ears. 1 week ago developed right ear fullness and severe ear pain after getting water in the ear. Symptoms developed bilaterally. Treated with drops and then oral abx.  Reports significant improvement in symptoms    Past Medical History     Past Medical History:   Diagnosis Date    HLD (hyperlipidemia)     IFG (impaired fasting glucose)     PRACHI (obstructive sleep apnea)     nasal pillow cpap, Dr Mackenzie Ryan       Past Surgical History     Past Surgical History:   Procedure Laterality Date    CYST REMOVAL N/A 2016    3 cysts on scalp    HEMORRHOID SURGERY      HERNIA REPAIR      B/L inguinal       Family History     Family History   Problem Relation Age of Onset    Hypertension Mother     Other Father         Chronic kidney infx,        Social History     Social History     Tobacco Use    Smoking status: Former Smoker     Packs/day: 0.10     Types: Cigars     Quit date: 3/1/2020     Years since quittin.2    Smokeless tobacco: Former User     Quit date: 11/15/2015    Tobacco comment: states 1 cigar a week when fishing   Vaping Use    Vaping Use: Never used   Substance Use Topics    Alcohol use: No     Alcohol/week: 0.0 standard drinks    Drug use: No        Allergies     No Known Allergies    Medications     Current Outpatient Medications   Medication Sig Dispense Refill    amoxicillin-clavulanate (AUGMENTIN) 875-125 MG per tablet Take 1 tablet by mouth 2 times daily for 10 days 20 tablet 0    tamsulosin (FLOMAX) 0.4 MG capsule TAKE 1 CAPSULE BY MOUTH EVERY DAY 30 capsule 12    senna (SENOKOT) 8.6 MG TABS tablet TAKE 1 TABLET BY MOUTH TWICE A DAY 60 tablet 11    fluticasone (FLONASE) 50 MCG/ACT nasal spray SPRAY 2 SPRAYS INTO EACH NOSTRIL EVERY DAY 1 Bottle 5    clobetasol (TEMOVATE) 0.05 % ointment APPLY TO AFFECTED AREA TWICE A DAY 15 g 1    Cholecalciferol (VITAMIN D-3 SUPER STRENGTH) 50 MCG (2000 UT) TABS TAKE 1 TABLET BY MOUTH EVERY DAY 30 tablet 12    Multiple Vitamin (THERA/BETA-CAROTENE) TABS Take 1 tablet by mouth daily 30 tablet 12    ibuprofen (ADVIL;MOTRIN) 800 MG tablet TAKE 1 TABLET BY MOUTH EVERY 8 HOURS AS NEEDED FOR PAIN 90 tablet 3    Calcium Polycarbophil (FIBER) 625 MG TABS Take 1 tablet by mouth 2 times daily 60 tablet 5    GAVILAX powder TAKE 17 GRAMS BY MOUTH DAILY 510 g 3     No current facility-administered medications for this visit. Review of Systems     Review of Systems   Constitutional: Negative for appetite change, chills, fatigue, fever and unexpected weight change. HENT: Positive for ear pain and hearing loss. Negative for congestion, ear discharge, facial swelling, nosebleeds, postnasal drip, sinus pressure, sneezing, sore throat, tinnitus, trouble swallowing and voice change. Eyes: Negative for itching. Respiratory: Negative for apnea, cough and shortness of breath. Gastrointestinal:        Negative for dysphasia   Endocrine: Negative for cold intolerance and heat intolerance. Musculoskeletal: Negative for myalgias and neck pain. Skin: Negative for rash. Allergic/Immunologic: Negative for environmental allergies. Neurological: Negative for dizziness and headaches. Psychiatric/Behavioral: Negative for confusion, decreased concentration and sleep disturbance.          PhysicalExam     Vitals:    06/11/21 1600   BP: 124/74   Site: Left Upper Arm   Position: Sitting   Cuff Size: Large Adult   Pulse: 88   Temp: 97.5 °F (36.4 °C)

## 2021-06-21 ENCOUNTER — OFFICE VISIT (OUTPATIENT)
Dept: FAMILY MEDICINE CLINIC | Age: 46
End: 2021-06-21
Payer: MEDICAID

## 2021-06-21 VITALS
HEIGHT: 72 IN | TEMPERATURE: 97.3 F | SYSTOLIC BLOOD PRESSURE: 118 MMHG | WEIGHT: 247 LBS | BODY MASS INDEX: 33.46 KG/M2 | DIASTOLIC BLOOD PRESSURE: 78 MMHG | HEART RATE: 79 BPM | OXYGEN SATURATION: 97 %

## 2021-06-21 DIAGNOSIS — R73.01 IFG (IMPAIRED FASTING GLUCOSE): ICD-10-CM

## 2021-06-21 DIAGNOSIS — Z11.4 SCREENING FOR HIV WITHOUT PRESENCE OF RISK FACTORS: ICD-10-CM

## 2021-06-21 DIAGNOSIS — R53.82 CHRONIC FATIGUE: ICD-10-CM

## 2021-06-21 DIAGNOSIS — G47.33 OSA (OBSTRUCTIVE SLEEP APNEA): ICD-10-CM

## 2021-06-21 DIAGNOSIS — K64.8 OTHER HEMORRHOIDS: ICD-10-CM

## 2021-06-21 DIAGNOSIS — K60.2 ANAL FISSURE: ICD-10-CM

## 2021-06-21 DIAGNOSIS — Z11.59 NEED FOR HEPATITIS C SCREENING TEST: ICD-10-CM

## 2021-06-21 DIAGNOSIS — Z00.00 ANNUAL PHYSICAL EXAM: Primary | ICD-10-CM

## 2021-06-21 DIAGNOSIS — E78.5 HYPERLIPIDEMIA, UNSPECIFIED HYPERLIPIDEMIA TYPE: ICD-10-CM

## 2021-06-21 LAB
A/G RATIO: 1.9 (ref 1.1–2.2)
ALBUMIN SERPL-MCNC: 4.4 G/DL (ref 3.4–5)
ALP BLD-CCNC: 78 U/L (ref 40–129)
ALT SERPL-CCNC: 31 U/L (ref 10–40)
ANION GAP SERPL CALCULATED.3IONS-SCNC: 11 MMOL/L (ref 3–16)
AST SERPL-CCNC: 22 U/L (ref 15–37)
BASOPHILS ABSOLUTE: 0 K/UL (ref 0–0.2)
BASOPHILS RELATIVE PERCENT: 0.4 %
BILIRUB SERPL-MCNC: 0.3 MG/DL (ref 0–1)
BUN BLDV-MCNC: 10 MG/DL (ref 7–20)
CALCIUM SERPL-MCNC: 9.1 MG/DL (ref 8.3–10.6)
CHLORIDE BLD-SCNC: 105 MMOL/L (ref 99–110)
CHOLESTEROL, TOTAL: 195 MG/DL (ref 0–199)
CO2: 27 MMOL/L (ref 21–32)
CREAT SERPL-MCNC: 0.8 MG/DL (ref 0.9–1.3)
EOSINOPHILS ABSOLUTE: 0.1 K/UL (ref 0–0.6)
EOSINOPHILS RELATIVE PERCENT: 1.7 %
GFR AFRICAN AMERICAN: >60
GFR NON-AFRICAN AMERICAN: >60
GLOBULIN: 2.3 G/DL
GLUCOSE BLD-MCNC: 102 MG/DL (ref 70–99)
HCT VFR BLD CALC: 40.3 % (ref 40.5–52.5)
HDLC SERPL-MCNC: 27 MG/DL (ref 40–60)
HEMOGLOBIN: 14.2 G/DL (ref 13.5–17.5)
HEPATITIS C ANTIBODY INTERPRETATION: NORMAL
LDL CHOLESTEROL CALCULATED: ABNORMAL MG/DL
LDL CHOLESTEROL DIRECT: 106 MG/DL
LYMPHOCYTES ABSOLUTE: 1.5 K/UL (ref 1–5.1)
LYMPHOCYTES RELATIVE PERCENT: 21.8 %
MCH RBC QN AUTO: 29.6 PG (ref 26–34)
MCHC RBC AUTO-ENTMCNC: 35.2 G/DL (ref 31–36)
MCV RBC AUTO: 83.9 FL (ref 80–100)
MONOCYTES ABSOLUTE: 0.4 K/UL (ref 0–1.3)
MONOCYTES RELATIVE PERCENT: 5.6 %
NEUTROPHILS ABSOLUTE: 5 K/UL (ref 1.7–7.7)
NEUTROPHILS RELATIVE PERCENT: 70.5 %
PDW BLD-RTO: 12.8 % (ref 12.4–15.4)
PLATELET # BLD: 217 K/UL (ref 135–450)
PMV BLD AUTO: 8.5 FL (ref 5–10.5)
POTASSIUM SERPL-SCNC: 4.7 MMOL/L (ref 3.5–5.1)
RBC # BLD: 4.81 M/UL (ref 4.2–5.9)
SODIUM BLD-SCNC: 143 MMOL/L (ref 136–145)
TOTAL PROTEIN: 6.7 G/DL (ref 6.4–8.2)
TRIGL SERPL-MCNC: 407 MG/DL (ref 0–150)
TSH REFLEX FT4: 0.98 UIU/ML (ref 0.27–4.2)
VLDLC SERPL CALC-MCNC: ABNORMAL MG/DL
WBC # BLD: 7.1 K/UL (ref 4–11)

## 2021-06-21 PROCEDURE — 36415 COLL VENOUS BLD VENIPUNCTURE: CPT | Performed by: FAMILY MEDICINE

## 2021-06-21 PROCEDURE — 99396 PREV VISIT EST AGE 40-64: CPT | Performed by: FAMILY MEDICINE

## 2021-06-21 RX ORDER — PRAMOXINE HYDROCHLORIDE HYDROCORTISONE ACETATE 100; 100 MG/10G; MG/10G
AEROSOL, FOAM TOPICAL
Qty: 1 CAN | Refills: 3 | Status: SHIPPED | OUTPATIENT
Start: 2021-06-21 | End: 2022-05-31

## 2021-06-21 ASSESSMENT — PATIENT HEALTH QUESTIONNAIRE - PHQ9
SUM OF ALL RESPONSES TO PHQ9 QUESTIONS 1 & 2: 0
SUM OF ALL RESPONSES TO PHQ QUESTIONS 1-9: 0
SUM OF ALL RESPONSES TO PHQ QUESTIONS 1-9: 0
2. FEELING DOWN, DEPRESSED OR HOPELESS: 0
SUM OF ALL RESPONSES TO PHQ QUESTIONS 1-9: 0
1. LITTLE INTEREST OR PLEASURE IN DOING THINGS: 0

## 2021-06-21 NOTE — PROGRESS NOTES
by Dr Shalom Ellis, internal hemorrhoids found. Describes fatigue despite adequate sleep. Lab Results   Component Value Date    LABA1C 5.4 10/22/2020    LABA1C 5.5 08/20/2017    LABA1C 5.3 08/10/2016     :        Current Outpatient Medications   Medication Sig Dispense Refill    Hydrocort-Pramoxine, Perianal, (PROCTOFOAM HC) 1-1 % rectal foam Apply qid for up to 7 days prn flare of hemorrhoid or fissues 1 Can 3    tamsulosin (FLOMAX) 0.4 MG capsule TAKE 1 CAPSULE BY MOUTH EVERY DAY 30 capsule 12    Cholecalciferol (VITAMIN D-3 SUPER STRENGTH) 50 MCG (2000 UT) TABS TAKE 1 TABLET BY MOUTH EVERY DAY 30 tablet 12    Multiple Vitamin (THERA/BETA-CAROTENE) TABS Take 1 tablet by mouth daily 30 tablet 12    fluticasone (FLONASE) 50 MCG/ACT nasal spray SPRAY 2 SPRAYS INTO EACH NOSTRIL EVERY DAY (Patient not taking: Reported on 6/21/2021) 1 Bottle 5    clobetasol (TEMOVATE) 0.05 % ointment APPLY TO AFFECTED AREA TWICE A DAY (Patient not taking: Reported on 6/21/2021) 15 g 1     No current facility-administered medications for this visit. Patient's past medical history,surgical history, family history, medications,  and allergies  were all reviewed and updated as appropriate today. Review of Systems  Comprehensive ROS normal, except positives in HPI      Physical Exam  Constitutional:       General: He is not in acute distress. Appearance: Normal appearance. He is well-developed. He is not ill-appearing. HENT:      Head: Normocephalic and atraumatic. Right Ear: Tympanic membrane, ear canal and external ear normal.      Left Ear: Tympanic membrane, ear canal and external ear normal.      Mouth/Throat:      Pharynx: Oropharynx is clear. No oropharyngeal exudate. Eyes:      General: No scleral icterus. Right eye: No discharge. Left eye: No discharge. Extraocular Movements: Extraocular movements intact.       Conjunctiva/sclera: Conjunctivae normal.   Neck:      Vascular: No carotid

## 2021-06-22 LAB
ESTIMATED AVERAGE GLUCOSE: 108.3 MG/DL
HBA1C MFR BLD: 5.4 %
HIV AG/AB: NORMAL
HIV ANTIGEN: NORMAL
HIV-1 ANTIBODY: NORMAL
HIV-2 AB: NORMAL

## 2021-06-22 RX ORDER — OMEGA-3-ACID ETHYL ESTERS 1 G/1
1 CAPSULE, LIQUID FILLED ORAL 2 TIMES DAILY
Qty: 60 CAPSULE | Refills: 12 | Status: SHIPPED | OUTPATIENT
Start: 2021-06-22 | End: 2022-07-14

## 2021-06-23 ENCOUNTER — TELEPHONE (OUTPATIENT)
Dept: PAIN MANAGEMENT | Age: 46
End: 2021-06-23

## 2021-06-23 DIAGNOSIS — E78.1 HYPERTRIGLYCERIDEMIA: Primary | ICD-10-CM

## 2021-06-24 ENCOUNTER — PATIENT MESSAGE (OUTPATIENT)
Dept: FAMILY MEDICINE CLINIC | Age: 46
End: 2021-06-24

## 2021-06-24 RX ORDER — FENOFIBRATE 160 MG/1
160 TABLET ORAL DAILY
Qty: 90 TABLET | Refills: 3 | Status: SHIPPED | OUTPATIENT
Start: 2021-06-24 | End: 2021-07-21

## 2021-06-24 NOTE — TELEPHONE ENCOUNTER
From: Mansi Luna  To: Santiago Antunez MD  Sent: 6/24/2021 1:48 PM EDT  Subject: Prescription Question    Actually if its okay with you, I would like to go ahead and try the medications. Thank you and sorry for any inconvenience. I just want to ensure that my health is good.   Thank you

## 2021-06-25 ENCOUNTER — TELEPHONE (OUTPATIENT)
Dept: ADMINISTRATIVE | Age: 46
End: 2021-06-25

## 2021-06-25 NOTE — TELEPHONE ENCOUNTER
Submitted PA for FENOFIBRATE  [facility]ia [facility] Key: [id number]  STATUS: \"Your PA request has been closed. NO PA REQUIRED // CALLED RX TO RUN FOR COVERED NDC\"    If this requires a response please respond to the pool ( P [id number] [facility]). Thank you please advise patient.

## 2021-07-20 ENCOUNTER — TELEPHONE (OUTPATIENT)
Dept: ADMINISTRATIVE | Age: 46
End: 2021-07-20

## 2021-07-20 DIAGNOSIS — R51.9 HEADACHE DISORDER: ICD-10-CM

## 2021-07-20 DIAGNOSIS — M25.531 PAIN IN RIGHT WRIST: ICD-10-CM

## 2021-07-20 DIAGNOSIS — M79.641 PAIN OF RIGHT HAND: ICD-10-CM

## 2021-07-20 RX ORDER — IBUPROFEN 800 MG/1
TABLET ORAL
Qty: 90 TABLET | Refills: 3 | Status: SHIPPED | OUTPATIENT
Start: 2021-07-20

## 2021-07-20 NOTE — TELEPHONE ENCOUNTER
Last Office Visit  -  06/22/21  Next Office Visit  -  06/22/22    Last Filled  -  10/17/19  Last UDS -  n/a  Contract -  n/a

## 2021-07-21 RX ORDER — FENOFIBRATE 145 MG/1
145 TABLET, COATED ORAL DAILY
Qty: 30 TABLET | Refills: 12 | Status: SHIPPED | OUTPATIENT
Start: 2021-07-21 | End: 2022-07-21 | Stop reason: SDUPTHER

## 2021-08-02 ENCOUNTER — OFFICE VISIT (OUTPATIENT)
Dept: SURGERY | Age: 46
End: 2021-08-02
Payer: MEDICAID

## 2021-08-02 VITALS
SYSTOLIC BLOOD PRESSURE: 147 MMHG | WEIGHT: 243 LBS | DIASTOLIC BLOOD PRESSURE: 87 MMHG | TEMPERATURE: 98.6 F | HEART RATE: 83 BPM | OXYGEN SATURATION: 97 % | BODY MASS INDEX: 32.91 KG/M2 | HEIGHT: 72 IN

## 2021-08-02 DIAGNOSIS — K60.2 FISSURE, ANAL: Primary | ICD-10-CM

## 2021-08-02 PROCEDURE — G8427 DOCREV CUR MEDS BY ELIG CLIN: HCPCS | Performed by: SURGERY

## 2021-08-02 PROCEDURE — G8417 CALC BMI ABV UP PARAM F/U: HCPCS | Performed by: SURGERY

## 2021-08-02 PROCEDURE — 1036F TOBACCO NON-USER: CPT | Performed by: SURGERY

## 2021-08-02 PROCEDURE — 99213 OFFICE O/P EST LOW 20 MIN: CPT | Performed by: SURGERY

## 2021-08-02 NOTE — PROGRESS NOTES
Subjective:     Patient is a 54 y.o. man with recurrent fissure    HPI: Mr. William Moreira reports fissure symptoms developed over last few weeks. He started topical steroids which haven't helped. He occasionally has to digitate to remove hard stools. Patient Active Problem List    Diagnosis Date Noted    Wrist sprain 2017     Past Medical History:   Diagnosis Date    HLD (hyperlipidemia)     IFG (impaired fasting glucose)     PRACHI (obstructive sleep apnea)     nasal pillow cpap, Dr Ayden Conley      Past Surgical History:   Procedure Laterality Date    CYST REMOVAL N/A 2016    3 cysts on scalp    HEMORRHOID SURGERY      HERNIA REPAIR      B/L inguinal      Not in a hospital admission. No Known Allergies   Social History     Tobacco Use    Smoking status: Former Smoker     Packs/day: 0.10     Types: Cigars     Quit date: 3/1/2020     Years since quittin.4    Smokeless tobacco: Former User     Quit date: 11/15/2015    Tobacco comment: states 1 cigar a week when fishing   Substance Use Topics    Alcohol use: No     Alcohol/week: 0.0 standard drinks      Family History   Problem Relation Age of Onset    Hypertension Mother     Other Father         Chronic kidney infx,       Review of Systems    GEN: reviewed and negative except as noted in HPI. GI: reviewed and negative except as noted in HPI. Objective:     GEN: appears well, no distress, appears stated age  PSYCH: normal mood, normal affect  NECK: no neck masses, trachea midline  ENT: moist oral mucosa; anicteric  SKIN: no rash or jaundice  CV: regular heart rate and rhythm  PULM: normal respiratory effort, no wheezing  GI: soft non tender abdomen. Normal bowel sounds  RECTAL: anterior fissure. Digital deferred to not aggravate. Healed scars. No fistula seen   EXT/NEURO: normal gait, strength/sensation grossly intact in all extremities      Assessment:     Anal fissure     Plan:     Start topicals.    See me 1 month    Called Diltiaz[place] to Vimal Sloan M.D.  8/2/21   2:45 PM

## 2021-08-05 RX ORDER — CHOLECALCIFEROL (VITAMIN D3) 125 MCG
CAPSULE ORAL
Qty: 30 TABLET | Refills: 7 | Status: SHIPPED | OUTPATIENT
Start: 2021-08-05 | End: 2022-04-11

## 2021-08-11 ENCOUNTER — HOSPITAL ENCOUNTER (EMERGENCY)
Age: 46
Discharge: HOME | End: 2021-08-11
Payer: MEDICAID

## 2021-08-11 VITALS
RESPIRATION RATE: 14 BRPM | WEIGHT: 235 LBS | DIASTOLIC BLOOD PRESSURE: 101 MMHG | HEART RATE: 98 BPM | TEMPERATURE: 98.8 F | SYSTOLIC BLOOD PRESSURE: 138 MMHG | BODY MASS INDEX: 31.83 KG/M2 | HEIGHT: 72 IN | OXYGEN SATURATION: 97 %

## 2021-08-11 DIAGNOSIS — H92.01 OTALGIA OF RIGHT EAR: Primary | ICD-10-CM

## 2021-08-11 PROCEDURE — 99283 EMERGENCY DEPT VISIT LOW MDM: CPT

## 2021-08-11 PROCEDURE — 6370000000 HC RX 637 (ALT 250 FOR IP): Performed by: PHYSICIAN ASSISTANT

## 2021-08-11 RX ORDER — AMOXICILLIN AND CLAVULANATE POTASSIUM 875; 125 MG/1; MG/1
1 TABLET, FILM COATED ORAL ONCE
Status: COMPLETED | OUTPATIENT
Start: 2021-08-12 | End: 2021-08-11

## 2021-08-11 RX ORDER — AMOXICILLIN AND CLAVULANATE POTASSIUM 875; 125 MG/1; MG/1
1 TABLET, FILM COATED ORAL 2 TIMES DAILY
Qty: 20 TABLET | Refills: 0 | Status: SHIPPED | OUTPATIENT
Start: 2021-08-11 | End: 2021-08-21

## 2021-08-11 RX ADMIN — AMOXICILLIN AND CLAVULANATE POTASSIUM 1 TABLET: 875; 125 TABLET, FILM COATED ORAL at 23:53

## 2021-08-11 ASSESSMENT — PAIN SCALES - WONG BAKER: WONGBAKER_NUMERICALRESPONSE: 0

## 2021-08-11 ASSESSMENT — PAIN DESCRIPTION - LOCATION: LOCATION: EAR

## 2021-08-11 ASSESSMENT — PAIN DESCRIPTION - PAIN TYPE: TYPE: ACUTE PAIN

## 2021-08-11 ASSESSMENT — PAIN DESCRIPTION - ORIENTATION: ORIENTATION: RIGHT

## 2021-08-11 ASSESSMENT — PAIN SCALES - GENERAL: PAINLEVEL_OUTOF10: 6

## 2021-08-12 NOTE — ED PROVIDER NOTES
[facility] Emergency Department    CHIEF COMPLAINT  Otalgia (Right ear pain x1 day)      SHARED SERVICE VISIT:  Evaluated by ANRDEWS. My supervising physician was available for consultation. HISTORY OF PRESENT ILLNESS  Radha Nunez is a 54 y.o. male who presents to the ED complaining of several day history of right ear pain. Patient reports some mildly decreased hearing from ear. Has found no obvious aggravating or alleviating factors. Does not appear to radiate. He denies any headache, lightheadedness, dizziness confusion. No nasal congestion sore throat. Has not been swimming recently. He denies that he fevers or chills. Reports some mild muffled hearing without hearing loss or drainage. No other complaints, modifying factors or associated symptoms. Nursing notes reviewed.    Past Medical History:   Diagnosis Date    HLD (hyperlipidemia)     IFG (impaired fasting glucose)     PRACHI (obstructive sleep apnea)     nasal pillow cpap, Dr Watson Dover     Past Surgical History:   Procedure Laterality Date    CYST REMOVAL N/A 2016    3 cysts on scalp    HEMORRHOID SURGERY      HERNIA REPAIR      B/L inguinal     Family History   Problem Relation Age of Onset    Hypertension Mother     Other Father         Chronic kidney infx,      Social History     Socioeconomic History    Marital status:      Spouse name: Not on file    Number of children: Not on file    Years of education: Not on file    Highest education level: Not on file   Occupational History    Not on file   Tobacco Use    Smoking status: Former Smoker     Packs/day: 0.10     Types: Cigars     Quit date: 3/1/2020     Years since quittin.4    Smokeless tobacco: Former User     Quit date: 11/15/2015    Tobacco comment: states 1 cigar a week when fishing   Vaping Use    Vaping Use: Never used   Substance and Sexual Activity    Alcohol use: No     Alcohol/week: 0.0 standard drinks    Drug use: No    Sexual activity: Not on file   Other Topics Concern    Not on file   Social History Narrative    Not on file     Social Determinants of Health     Financial Resource Strain:     Difficulty of Paying Living Expenses:    Food Insecurity:     Worried About Running Out of Food in the Last Year:     [address] in the Last Year:    Transportation Needs:     Lack of Transportation (Medical):  Lack of Transportation (Non-Medical):    Physical Activity:     Days of Exercise per Week:     Minutes of Exercise per Session:    Stress:     Feeling of Stress :    Social Connections:     Frequency of Communication with Friends and Family:     Frequency of Social Gatherings with Friends and Family:     Attends Mormonism Services:     Active Member of Clubs or Organizations:     Attends Club or Organization Meetings:     Marital Status:    Intimate Partner Violence:     Fear of Current or Ex-Partner:     Emotionally Abused:     Physically Abused:     Sexually Abused:      No current facility-administered medications for this encounter.      Current Outpatient Medications   Medication Sig Dispense Refill    amoxicillin-clavulanate (AUGMENTIN) 875-125 MG per tablet Take 1 tablet by mouth 2 times daily for 10 days 20 tablet 0    Cholecalciferol (VITAMIN D3) 50 MCG (2000 UT) TABS TAKE 1 TABLET BY MOUTH EVERY DAY 30 tablet 7    fenofibrate (TRICOR) 145 MG tablet Take 1 tablet by mouth daily 30 tablet 12    omega-3 acid ethyl esters (LOVAZA) 1 g capsule Take 1 capsule by mouth 2 times daily 60 capsule 12    Hydrocort-Pramoxine, Perianal, (PROCTOFOAM HC) 1-1 % rectal foam Apply qid for up to 7 days prn flare of hemorrhoid or fissues 1 Can 3    tamsulosin (FLOMAX) 0.4 MG capsule TAKE 1 CAPSULE BY MOUTH EVERY DAY 30 capsule 12    fluticasone (FLONASE) 50 MCG/ACT nasal spray SPRAY 2 SPRAYS INTO EACH NOSTRIL EVERY DAY 1 Bottle 5    clobetasol (TEMOVATE) 0.05 % ointment APPLY TO AFFECTED AREA TWICE A DAY 15 g 1    Multiple Vitamin (THERA/BETA-CAROTENE) TABS Take 1 tablet by mouth daily 30 tablet 12    ibuprofen (ADVIL;MOTRIN) 800 MG tablet TAKE 1 TABLET BY MOUTH EVERY 8 HOURS AS NEEDED FOR PAIN 90 tablet 3     No Known Allergies    REVIEW OF SYSTEMS  6 systems reviewed, pertinent positives per HPI otherwise noted to be negative    PHYSICAL EXAM  BP (!) 138/101   Pulse 98   Temp 98.8 °F (37.1 °C) (Oral)   Resp 14   Ht 6' (1.829 m)   Wt 235 lb (106.6 kg)   SpO2 97%   BMI 31.87 kg/m²   GENERAL APPEARANCE: Awake and alert. Cooperative. No acute distress. HEAD: Normocephalic. Atraumatic. EYES: PERRL. EOM's grossly intact. ENT: Mucous membranes are moist.  No pain with manipulation of external ears. No mastoid tenderness. Canals clear without edema or exudate. Left TM unremarkable. Right TM slightly bulged and erythematous without evidence for TM rupture. LYMPH: No periauricular, submental, or cervical lymphadenopathy. NECK: Supple. Normal ROM. CHEST: Equal symmetric chest rise. LUNGS: Breathing is unlabored. Speaking comfortably in full sentences. Abdomen: Nondistended  EXTREMITIES: MAEE. No acute deformities. SKIN: Warm and dry. NEUROLOGICAL: Alert and oriented. Strength is 5/5 in all extremities and sensation is intact. ED COURSE  Pain control was not required while here in the emergency department. Patient initiated on Augmentin. Will be discharged on oral antibiotics with PCP and ENT referral.  Have discussed return precautions and recommendations for follow-up otherwise and patient is in agreement and comfortable discharge. A discussion was had with Mr. Brielle Segal regarding Ear pain, ED findings and recommendations for follow-up. Risk management discussed and shared decision making had with patient and/or surrogate. All questions were answered. Patient will follow up with PCP and ENT within 2 to 3 days for further evaluation/treatment.  Patient will return to ED for new/worsening symptoms. Patient was sent home with a prescription for Augmentin. MDM  I estimate there is LOW risk for a ANAPHYLAXIS, DEEP SPACE INFECTION (e.g., AIMEES ANGINA OR RETROPHARYNGEAL ABSCESS), EPIGLOTTITIS, MENINGITIS, or AIRWAY COMPROMISE, thus I consider the discharge disposition reasonable. Also, there is no evidence or peritonitis, sepsis, or toxicity. Johanna Vargas and I have discussed the diagnosis and risks, and we agree with discharging home to follow-up with their primary doctor. We also discussed returning to the Emergency Department immediately if new or worsening symptoms occur. We have discussed the symptoms which are most concerning (e.g., changing or worsening pain, trouble swallowing or breathing, neck stiffness or fever) that necessitate immediate return. Final Impression  1. Otalgia of right ear      Discharge Vital Signs:  Blood pressure (!) 138/101, pulse 98, temperature 98.8 °F (37.1 °C), temperature source Oral, resp. rate 14, height 6' (1.829 m), weight 235 lb (106.6 kg), SpO2 97 %. DISPOSITION  Patient was discharged to home in good condition.            Russell Antunez, [address]  08/12/21 0023

## 2021-08-25 RX ORDER — MULTIVITAMIN,THERAPEUTIC
TABLET ORAL
Qty: 30 TABLET | Refills: 12 | Status: SHIPPED | OUTPATIENT
Start: 2021-08-25 | End: 2022-10-18

## 2021-12-15 ENCOUNTER — OFFICE VISIT (OUTPATIENT)
Dept: ENT CLINIC | Age: 46
End: 2021-12-15
Payer: MEDICAID

## 2021-12-15 VITALS
HEIGHT: 72 IN | HEART RATE: 108 BPM | BODY MASS INDEX: 33.86 KG/M2 | SYSTOLIC BLOOD PRESSURE: 128 MMHG | DIASTOLIC BLOOD PRESSURE: 80 MMHG | WEIGHT: 250 LBS | TEMPERATURE: 98.1 F

## 2021-12-15 DIAGNOSIS — H60.8X3 CHRONIC ECZEMATOUS OTITIS EXTERNA OF BOTH EARS: ICD-10-CM

## 2021-12-15 DIAGNOSIS — H61.23 BILATERAL IMPACTED CERUMEN: Primary | ICD-10-CM

## 2021-12-15 DIAGNOSIS — G47.33 OSA (OBSTRUCTIVE SLEEP APNEA): ICD-10-CM

## 2021-12-15 PROCEDURE — G8417 CALC BMI ABV UP PARAM F/U: HCPCS | Performed by: OTOLARYNGOLOGY

## 2021-12-15 PROCEDURE — G8427 DOCREV CUR MEDS BY ELIG CLIN: HCPCS | Performed by: OTOLARYNGOLOGY

## 2021-12-15 PROCEDURE — G8484 FLU IMMUNIZE NO ADMIN: HCPCS | Performed by: OTOLARYNGOLOGY

## 2021-12-15 PROCEDURE — 4004F PT TOBACCO SCREEN RCVD TLK: CPT | Performed by: OTOLARYNGOLOGY

## 2021-12-15 PROCEDURE — 99213 OFFICE O/P EST LOW 20 MIN: CPT | Performed by: OTOLARYNGOLOGY

## 2021-12-15 PROCEDURE — 69210 REMOVE IMPACTED EAR WAX UNI: CPT | Performed by: OTOLARYNGOLOGY

## 2021-12-15 ASSESSMENT — ENCOUNTER SYMPTOMS
SINUS PRESSURE: 0
VOICE CHANGE: 0
SORE THROAT: 0
SHORTNESS OF BREATH: 0
TROUBLE SWALLOWING: 0
APNEA: 0
FACIAL SWELLING: 0
COUGH: 0
EYE ITCHING: 0

## 2021-12-15 NOTE — PATIENT INSTRUCTIONS
Mild Sleep Apnea (AHI 8.7) --> ask about oral appliance for sleep apnea at dentist    Use hydrocortisone cream (over the counter steroid cream) on opening to ear canal once a day x 1 week then as needed for itching

## 2021-12-15 NOTE — PROGRESS NOTES
Rika Palmer [address], [address], [address] [place] [address]  P: [phone]       Patient     Jarrett Napier  1975    ChiefComplaint     Chief Complaint   Patient presents with    Follow-up     States his ears are better, still has some itching, still having some wax, gets some pimples that he scratches with his nails. History of Present Illness     Sylvester Abraham is a 79-year-old male here today for follow-up regarding bilateral cerumen impactions, itchy right ear and obstructive sleep apnea. Muffled hearing has felt muffled over the last several weeks. Notes itching in the opening of both of his ears. Previously diagnosed with mild sleep apnea tolerating CPAP well but is look seeking alternatives.     Past Medical History     Past Medical History:   Diagnosis Date    HLD (hyperlipidemia)     IFG (impaired fasting glucose)     PRACHI (obstructive sleep apnea)     nasal pillow cpap, Dr Mitul Gauthier       Past Surgical History     Past Surgical History:   Procedure Laterality Date    CYST REMOVAL N/A 2016    3 cysts on scalp    HEMORRHOID SURGERY      HERNIA REPAIR      B/L inguinal       Family History     Family History   Problem Relation Age of Onset    Hypertension Mother     Other Father         Chronic kidney infx,        Social History     Social History     Tobacco Use    Smoking status: Current Every Day Smoker     Packs/day: 0.10     Types: Cigars     Last attempt to quit: 3/1/2020     Years since quittin.7    Smokeless tobacco: Never Used    Tobacco comment: states 1 cigar a week when fishing   Vaping Use    Vaping Use: Never used   Substance Use Topics    Alcohol use: No     Alcohol/week: 0.0 standard drinks    Drug use: No        Allergies     No Known Allergies    Medications     Current Outpatient Medications   Medication Sig Dispense Refill    Multiple Vitamin (THERA-TABS) TABS TAKE 1 TABLET BY MOUTH EVERY DAY 30 tablet 12    Cholecalciferol (VITAMIN D3) 50 MCG (2000 UT) TABS TAKE 1 TABLET BY MOUTH EVERY DAY 30 tablet 7    fenofibrate (TRICOR) 145 MG tablet Take 1 tablet by mouth daily 30 tablet 12    ibuprofen (ADVIL;MOTRIN) 800 MG tablet TAKE 1 TABLET BY MOUTH EVERY 8 HOURS AS NEEDED FOR PAIN 90 tablet 3    omega-3 acid ethyl esters (LOVAZA) 1 g capsule Take 1 capsule by mouth 2 times daily 60 capsule 12    Hydrocort-Pramoxine, Perianal, (PROCTOFOAM HC) 1-1 % rectal foam Apply qid for up to 7 days prn flare of hemorrhoid or fissues 1 Can 3    tamsulosin (FLOMAX) 0.4 MG capsule TAKE 1 CAPSULE BY MOUTH EVERY DAY 30 capsule 12    fluticasone (FLONASE) 50 MCG/ACT nasal spray SPRAY 2 SPRAYS INTO EACH NOSTRIL EVERY DAY 1 Bottle 5    clobetasol (TEMOVATE) 0.05 % ointment APPLY TO AFFECTED AREA TWICE A DAY 15 g 1     No current facility-administered medications for this visit. Review of Systems     Review of Systems   Constitutional: Negative for appetite change, chills, fatigue, fever and unexpected weight change. HENT: Positive for hearing loss. Negative for congestion, ear discharge, ear pain, facial swelling, nosebleeds, postnasal drip, sinus pressure, sneezing, sore throat, tinnitus, trouble swallowing and voice change. Eyes: Negative for itching. Respiratory: Negative for apnea, cough and shortness of breath. Endocrine: Negative for cold intolerance and heat intolerance. Musculoskeletal: Negative for myalgias and neck pain. Skin: Negative for rash. Allergic/Immunologic: Negative for environmental allergies. Neurological: Negative for dizziness and headaches. Psychiatric/Behavioral: Negative for confusion, decreased concentration and sleep disturbance.          PhysicalExam     Vitals:    12/15/21 1600   BP: 128/80   Site: Right Upper Arm   Position: Sitting   Cuff Size: Large Adult   Pulse: 108   Temp: 98.1 °F (36.7 °C)   TempSrc: Infrared   Weight: 250 lb (113.4 kg)   Height: 6' (1.829 m)       Physical Exam  Constitutional:       General: He is not in acute distress. Appearance: He is well-developed. HENT:      Head: Normocephalic and atraumatic. Right Ear: Ear canal and external ear normal. No drainage. There is impacted cerumen. Left Ear: Ear canal and external ear normal. No drainage. There is impacted cerumen. Ears:        Nose: Septal deviation (right) present. No mucosal edema or rhinorrhea. Right Turbinates: Enlarged. Mouth/Throat:      Lips: Pink. Mouth: Mucous membranes are moist.      Tongue: No lesions. Palate: No mass. Pharynx: Uvula midline. Tonsils: 2+ on the right. 2+ on the left. Eyes:      Pupils: Pupils are equal, round, and reactive to light. Neck:      Thyroid: No thyroid mass or thyromegaly. Trachea: Trachea and phonation normal.   Cardiovascular:      Pulses: Normal pulses. Pulmonary:      Effort: Pulmonary effort is normal. No accessory muscle usage or respiratory distress. Breath sounds: No stridor. Musculoskeletal:      Cervical back: Full passive range of motion without pain. Lymphadenopathy:      Head:      Right side of head: No submental or submandibular adenopathy. Left side of head: No submental or submandibular adenopathy. Cervical: No cervical adenopathy. Right cervical: No superficial, deep or posterior cervical adenopathy. Left cervical: No superficial, deep or posterior cervical adenopathy. Skin:     General: Skin is warm and dry. Neurological:      Mental Status: He is alert and oriented to person, place, and time. Cranial Nerves: No cranial nerve deficit. Coordination: Coordination normal.      Gait: Gait normal.   Psychiatric:         Thought Content: Thought content normal.       Removal Impacted Cerumen    An operating microscope was utilized to visualize the external auditory canals using a 2mm speculum.   Cerumen was removed with curettes and dalal suctions on both sides.  The tympanic membrane is intact. No fluid visualized in the middle ear. No complications. Assessment and Plan     1. Bilateral impacted cerumen  -Mood without complication  2. Chronic eczematous otitis externa of both ears  -Discussed use of over-the-counter hydrocortisone cream daily x1 week then as needed    3. PRACHI (obstructive sleep apnea)  -2018 PSG reviewed mild sleep apnea with AHI of 8.7. Symptoms are currently modest controlled on CPAP is seeking alternative. We discussed options of weight loss, oral appliance that can be fitted by dentist.  Discussed inspire but he is not a candidate as he has mild sleep apnea rather than moderate or severe. Return in 6 months or sooner as needed    Elodia Hernandez DO  12/15/21      Portions of this note were dictated using Dragon.  There may be linguistic errors secondary to the use of this program.

## 2022-02-10 ENCOUNTER — TELEPHONE (OUTPATIENT)
Dept: FAMILY MEDICINE CLINIC | Age: 47
End: 2022-02-10

## 2022-02-10 NOTE — TELEPHONE ENCOUNTER
Informed wife and she verbalized understanding. Once test is back will contact office for further steps.

## 2022-02-10 NOTE — TELEPHONE ENCOUNTER
Received call from Pt's wife and ECC, marked urgent, stating that Pt has had a persistent cough for a while and would like to be seen in person.      Please advise

## 2022-02-10 NOTE — TELEPHONE ENCOUNTER
Pls ask if he has had a recent covid test. If not, pls offer covid test appt if we are still offering, or let him know about [facility] drive through testing via [employer]. If he has had a recent neg covid test, pls offer [facility] appt tonight if avail.  Thx.

## 2022-02-12 ENCOUNTER — HOSPITAL ENCOUNTER (EMERGENCY)
Age: 47
Discharge: HOME | End: 2022-02-12
Attending: STUDENT IN AN ORGANIZED HEALTH CARE EDUCATION/TRAINING PROGRAM
Payer: MEDICAID

## 2022-02-12 VITALS
HEART RATE: 76 BPM | DIASTOLIC BLOOD PRESSURE: 96 MMHG | RESPIRATION RATE: 14 BRPM | TEMPERATURE: 99 F | OXYGEN SATURATION: 96 % | SYSTOLIC BLOOD PRESSURE: 132 MMHG | HEIGHT: 72 IN | WEIGHT: 240 LBS | BODY MASS INDEX: 32.51 KG/M2

## 2022-02-12 DIAGNOSIS — J02.9 SORE THROAT: Primary | ICD-10-CM

## 2022-02-12 LAB — S PYO AG THROAT QL: NEGATIVE

## 2022-02-12 PROCEDURE — 87880 STREP A ASSAY W/OPTIC: CPT

## 2022-02-12 PROCEDURE — 99284 EMERGENCY DEPT VISIT MOD MDM: CPT

## 2022-02-12 PROCEDURE — 87081 CULTURE SCREEN ONLY: CPT

## 2022-02-14 LAB — S PYO THROAT QL CULT: NORMAL

## 2022-02-14 NOTE — ED PROVIDER NOTES
[facility]  ED      CHIEF COMPLAINT  Sore throat    HISTORY OF PRESENT ILLNESS  Mayo Jaeger is a 51 y.o. male with past medical history of hyperlipidemia and obstructive sleep apnea who presents to the ED complaining of sore throat. Patient reports several months of intermittent symptoms. He reports a feeling of itching in his throat followed by coughing. This would typically resolve with drinking more milk. Patient states that this has been happening approximately once per month. For the last 3 days it is happen daily. Patient is tolerating secretions. They deny muffled voice. They deny difficulty breathing or swallowing. He is currently asymptomatic. No other complaints, modifying factors or associated symptoms. I have reviewed the following from the nursing documentation.     Past Medical History:   Diagnosis Date    HLD (hyperlipidemia)     IFG (impaired fasting glucose)     PRACHI (obstructive sleep apnea)     nasal pillow cpap, Dr Trevor Rangel     Past Surgical History:   Procedure Laterality Date    CYST REMOVAL N/A 2016    3 cysts on scalp    HEMORRHOID SURGERY      HERNIA REPAIR      B/L inguinal     Family History   Problem Relation Age of Onset    Hypertension Mother     Other Father         Chronic kidney infx,      Social History     Socioeconomic History    Marital status:      Spouse name: Not on file    Number of children: Not on file    Years of education: Not on file    Highest education level: Not on file   Occupational History    Not on file   Tobacco Use    Smoking status: Current Every Day Smoker     Packs/day: 0.10     Types: Cigars     Last attempt to quit: 3/1/2020     Years since quittin.9    Smokeless tobacco: Never Used    Tobacco comment: states 1 cigar a week when fishing   Vaping Use    Vaping Use: Never used   Substance and Sexual Activity    Alcohol use: No     Alcohol/week: 0.0 standard drinks  Drug use: No    Sexual activity: Not on file   Other Topics Concern    Not on file   Social History Narrative    Not on file     Social Determinants of Health     Financial Resource Strain:     Difficulty of Paying Living Expenses: Not on file   Food Insecurity:     Worried About Running Out of Food in the Last Year: Not on file    Olya of Food in the Last Year: Not on file   Transportation Needs:     Lack of Transportation (Medical): Not on file    Lack of Transportation (Non-Medical): Not on file   Physical Activity:     Days of Exercise per Week: Not on file    Minutes of Exercise per Session: Not on file   Stress:     Feeling of Stress : Not on file   Social Connections:     Frequency of Communication with Friends and Family: Not on file    Frequency of Social Gatherings with Friends and Family: Not on file    Attends Ten[employer] Services: Not on file    Active Member of [address] [place] [address] [employer] or Organizations: Not on file    Attends Club or Organization Meetings: Not on file    Marital Status: Not on file   Intimate Partner Violence:     Fear of Current or Ex-Partner: Not on file    Emotionally Abused: Not on file    Physically Abused: Not on file    Sexually Abused: Not on file   Housing Stability:     Unable to Pay for Housing in the Last Year: Not on file    Number of [place] in the Last Year: Not on file    Unstable Housing in the Last Year: Not on file     No current facility-administered medications for this encounter.      Current Outpatient Medications   Medication Sig Dispense Refill    Multiple Vitamin (THERA-TABS) TABS TAKE 1 TABLET BY MOUTH EVERY DAY 30 tablet 12    Cholecalciferol (VITAMIN D3) 50 MCG (2000 UT) TABS TAKE 1 TABLET BY MOUTH EVERY DAY 30 tablet 7    fenofibrate (TRICOR) 145 MG tablet Take 1 tablet by mouth daily 30 tablet 12    tamsulosin (FLOMAX) 0.4 MG capsule TAKE 1 CAPSULE BY MOUTH EVERY DAY 30 capsule 12    fluticasone (FLONASE) 50 MCG/ACT nasal spray SPRAY 2 SPRAYS INTO EACH NOSTRIL EVERY DAY 1 Bottle 5    clobetasol (TEMOVATE) 0.05 % ointment APPLY TO AFFECTED AREA TWICE A DAY 15 g 1    ibuprofen (ADVIL;MOTRIN) 800 MG tablet TAKE 1 TABLET BY MOUTH EVERY 8 HOURS AS NEEDED FOR PAIN 90 tablet 3    omega-3 acid ethyl esters (LOVAZA) 1 g capsule Take 1 capsule by mouth 2 times daily 60 capsule 12    Hydrocort-Pramoxine, Perianal, (PROCTOFOAM HC) 1-1 % rectal foam Apply qid for up to 7 days prn flare of hemorrhoid or fissues 1 Can 3     No Known Allergies    REVIEW OF SYSTEMS  All systems reviewed, pertinent positives per HPI otherwise noted to be negative. PHYSICAL EXAM  BP (!) 132/96   Pulse 76   Temp 99 °F (37.2 °C) (Oral)   Resp 14   Ht 6' (1.829 m)   Wt 240 lb (108.9 kg)   SpO2 96%   BMI 32.55 kg/m²    GENERAL APPEARANCE: Awake and alert. Cooperative. no distress. ENT: Mucous membranes are moist. Tolerates saliva. No trismus. Posterior oropharynx shows no erythema, tonsillar hypertrophy or exudates. Uvula is  midline. Submandibular area is soft. No acute facial rash. NECK: Supple. No meningismus. Trachea midline. Anterior cervical LAD is not present. No stridor  EYES: PERRL. EOM's grossly intact. Sclera anicteric. HEART/CHEST: RRR. No murmurs. LUNGS: Respirations unlabored. CTAB. Good air exchange. Speaking comfortably in full sentences. ABDOMEN: No tenderness. Soft. Non-distended. No masses. No organomegaly. No guarding or rebound. Splenomegaly is not present. MUSCULOSKELETAL: No extremity edema. Compartments soft. No deformity. No tenderness in the extremities. All extremities neurovascularly intact. SKIN: Warm and dry. No acute rashes. NEUROLOGICAL: Alert and oriented. CN's 2-12 intact. No gross facial drooping. Strength 5/5, sensation intact. PSYCHIATRIC: Normal mood and affect. LABS  I have reviewed all labs for this visit.    Results for orders placed or performed during the hospital encounter of 02/12/22   Strep screen group a throat    Specimen: Throat   Result Value Ref Range    Rapid Strep A Screen Negative Negative   Culture, Beta Strep Confirm Plates    Specimen: Throat   Result Value Ref Range    Strep A Culture Further report to follow        RADIOLOGY  No orders to display         ED COURSE / MDM  Patient seen and evaluated. Old records reviewed and pertinent information included in HPI. Labs and imaging reviewed and results discussed with patient. Overall well appearing patient, in no acute distress, presenting for intermittent itchy feeling in throat. No evidence of airway compromise, no difficulty swallowing. Physical exam remarkable for unremarkable oropharyngeal and neck exam.     Differential diagnosis includes but is not limited to: Viral pharyngitis, strep throat, mononucleosis, malignant low suspicion for peritonsillar abscess, retropharyngeal abscess, epiglottitis, or Kirill's angina    During the patient's ED course, the patient was given:  Medications - No data to display     Strep swab negative. On visual examination of the back of the throat, I do not appreciate evidence of peritonsillar abscess. Patient denies muffled voice, patient does not have neck stiffness and is tolerating secretions. There is no stridor on exam.  Low suspicion for retropharyngeal abscess or epiglottitis at this time. Floor of mouth is soft to palpation, no evidence of Kirill's angina. There is no evidence of airway compromise at this time. Patient denies any current symptoms. Patient is not having any difficulty breathing. At this time, do not appreciate an emergent need for imaging. Evaluation overall reassuring. At this time, feel the patient is appropriate for discharge to follow-up with a primary care doctor. Patient feels comfortable with discharge at this time. Return precautions given. Encouraged PCP follow-up in 2 days, also given referral to otolaryngology. Patient discharged in stable condition.       I estimate there is LOW risk for DEEP SPACE INFECTION (e.g., AIMEES ANGINA OR RETROPHARYNGEAL ABSCESS), BACTERIAL MENINGITIS, BACTERIAL TRACHEITIS, or AIRWAY COMPROMISE, thus I consider the discharge disposition reasonable. Loniriley Rocha(or their surrogate) and I have discussed the diagnosis and risks, and we agree with discharging home with close follow-up. We also discussed returning to the Emergency Department immediately if new or worsening symptoms occur. We have discussed the symptoms which are most concerning that necessitate immediate return. CLINICAL IMPRESSION  1. Sore throat        Blood pressure (!) 132/96, pulse 76, temperature 99 °F (37.2 °C), temperature source Oral, resp. rate 14, height 6' (1.829 m), weight 240 lb (108.9 kg), SpO2 96 %. DISPOSITION  James Modi was discharged to home in stable condition. Patient was given scripts for the following medications. I counseled patient how to take these medications. Discharge Medication List as of 2/12/2022  9:28 AM          Follow-up with:  Cesar Hernández MD  [address] [facility] [address] [facility] [place]  [phone]    Schedule an appointment as soon as possible for a visit in 2 days      [facility]  ED  [address]  Go to   As needed, If symptoms worsen    Frank Guzman, DO  [address] [facility]  [address]  [address] [facility] [address]            DISCLAIMER: This chart was created using [address] [facility]. Efforts were made by me to ensure accuracy, however some errors may be present due to limitations of this technology and occasionally words are not transcribed correctly.         Izaiah Burroughs MD  02/13/22 2019

## 2022-02-24 ENCOUNTER — OFFICE VISIT (OUTPATIENT)
Dept: ENT CLINIC | Age: 47
End: 2022-02-24
Payer: MEDICAID

## 2022-02-24 VITALS
BODY MASS INDEX: 33.81 KG/M2 | HEIGHT: 72 IN | WEIGHT: 249.6 LBS | HEART RATE: 113 BPM | SYSTOLIC BLOOD PRESSURE: 143 MMHG | TEMPERATURE: 98.1 F | DIASTOLIC BLOOD PRESSURE: 78 MMHG

## 2022-02-24 DIAGNOSIS — H61.23 BILATERAL IMPACTED CERUMEN: ICD-10-CM

## 2022-02-24 DIAGNOSIS — R05.9 COUGH: Primary | ICD-10-CM

## 2022-02-24 PROCEDURE — G8417 CALC BMI ABV UP PARAM F/U: HCPCS | Performed by: OTOLARYNGOLOGY

## 2022-02-24 PROCEDURE — 4004F PT TOBACCO SCREEN RCVD TLK: CPT | Performed by: OTOLARYNGOLOGY

## 2022-02-24 PROCEDURE — 99213 OFFICE O/P EST LOW 20 MIN: CPT | Performed by: OTOLARYNGOLOGY

## 2022-02-24 PROCEDURE — G8427 DOCREV CUR MEDS BY ELIG CLIN: HCPCS | Performed by: OTOLARYNGOLOGY

## 2022-02-24 PROCEDURE — 69210 REMOVE IMPACTED EAR WAX UNI: CPT | Performed by: OTOLARYNGOLOGY

## 2022-02-24 PROCEDURE — G8484 FLU IMMUNIZE NO ADMIN: HCPCS | Performed by: OTOLARYNGOLOGY

## 2022-02-24 RX ORDER — CETIRIZINE HYDROCHLORIDE 10 MG/1
10 TABLET ORAL DAILY
Qty: 30 TABLET | Refills: 11 | Status: SHIPPED | OUTPATIENT
Start: 2022-02-24 | End: 2022-03-26

## 2022-02-24 ASSESSMENT — ENCOUNTER SYMPTOMS
FACIAL SWELLING: 0
SINUS PRESSURE: 0
EYE ITCHING: 0
COUGH: 1
SORE THROAT: 0
VOICE CHANGE: 0
APNEA: 0
TROUBLE SWALLOWING: 0
SHORTNESS OF BREATH: 0

## 2022-02-24 NOTE — PROGRESS NOTES
Rika Palmer 94, [address], [address] [place] [address]  P: [phone]       Patient     Cordelia Delacruz  1975    ChiefComplaint     Chief Complaint   Patient presents with    Pharyngitis     Has been coming and going since 10/2021. States this time has been bothering him for 3-4 weeks. Eat and drink normal, just coughing and itching. History of Present Illness     Daisha Kaufman is a 58-year-old male here today for evaluation of intermittent cough. Ongoing since October. Describes an itching sensation in his throat that will then trigger a dry cough. Typically last for several weeks at a time and then resolves. Symptoms currently improving. Denies dysphagia, odynophagia, sore throat, otalgia. Has cut out dairy from his morning coffee and feels this is improved symptoms.     Past Medical History     Past Medical History:   Diagnosis Date    HLD (hyperlipidemia)     IFG (impaired fasting glucose)     PRACHI (obstructive sleep apnea)     nasal pillow cpap, Dr Nathan Spearing       Past Surgical History     Past Surgical History:   Procedure Laterality Date    CYST REMOVAL N/A 2016    3 cysts on scalp    HEMORRHOID SURGERY      HERNIA REPAIR      B/L inguinal       Family History     Family History   Problem Relation Age of Onset    Hypertension Mother     Other Father         Chronic kidney infx,        Social History     Social History     Tobacco Use    Smoking status: Current Every Day Smoker     Packs/day: 0.10     Types: Cigars     Last attempt to quit: 3/1/2020     Years since quittin.9    Smokeless tobacco: Never Used    Tobacco comment: states 1 cigar a week when fishing   Vaping Use    Vaping Use: Never used   Substance Use Topics    Alcohol use: No     Alcohol/week: 0.0 standard drinks    Drug use: No        Allergies     No Known Allergies    Medications     Current Outpatient Medications   Medication Sig Dispense Refill    cetirizine (ZYRTEC) 10 MG tablet Take 1 tablet by mouth daily 30 tablet 11    Multiple Vitamin (THERA-TABS) TABS TAKE 1 TABLET BY MOUTH EVERY DAY 30 tablet 12    Cholecalciferol (VITAMIN D3) 50 MCG (2000 UT) TABS TAKE 1 TABLET BY MOUTH EVERY DAY 30 tablet 7    fenofibrate (TRICOR) 145 MG tablet Take 1 tablet by mouth daily 30 tablet 12    ibuprofen (ADVIL;MOTRIN) 800 MG tablet TAKE 1 TABLET BY MOUTH EVERY 8 HOURS AS NEEDED FOR PAIN 90 tablet 3    omega-3 acid ethyl esters (LOVAZA) 1 g capsule Take 1 capsule by mouth 2 times daily 60 capsule 12    Hydrocort-Pramoxine, Perianal, (PROCTOFOAM HC) 1-1 % rectal foam Apply qid for up to 7 days prn flare of hemorrhoid or fissues 1 Can 3    tamsulosin (FLOMAX) 0.4 MG capsule TAKE 1 CAPSULE BY MOUTH EVERY DAY 30 capsule 12    fluticasone (FLONASE) 50 MCG/ACT nasal spray SPRAY 2 SPRAYS INTO EACH NOSTRIL EVERY DAY 1 Bottle 5    clobetasol (TEMOVATE) 0.05 % ointment APPLY TO AFFECTED AREA TWICE A DAY 15 g 1     No current facility-administered medications for this visit. Review of Systems     Review of Systems   Constitutional: Negative for appetite change, chills, fatigue, fever and unexpected weight change. HENT: Negative for congestion, ear discharge, ear pain, facial swelling, hearing loss, nosebleeds, postnasal drip, sinus pressure, sneezing, sore throat, tinnitus, trouble swallowing and voice change. Eyes: Negative for itching. Respiratory: Positive for cough. Negative for apnea and shortness of breath. Endocrine: Negative for cold intolerance and heat intolerance. Musculoskeletal: Negative for myalgias and neck pain. Skin: Negative for rash. Allergic/Immunologic: Negative for environmental allergies. Neurological: Negative for dizziness and headaches. Psychiatric/Behavioral: Negative for confusion, decreased concentration and sleep disturbance.          PhysicalExam     Vitals:    02/24/22 1527   BP: (!) 143/78   Site: Left Upper Arm Position: Sitting   Cuff Size: Medium Adult   Pulse: 113   Temp: 98.1 °F (36.7 °C)   TempSrc: Infrared   Weight: 249 lb 9.6 oz (113.2 kg)   Height: 6' (1.829 m)       Physical Exam  Constitutional:       General: He is not in acute distress. Appearance: He is well-developed. HENT:      Head: Normocephalic and atraumatic. Right Ear: Tympanic membrane, ear canal and external ear normal. No drainage. No middle ear effusion. There is impacted cerumen. Tympanic membrane is not bulging. Tympanic membrane has normal mobility. Left Ear: Tympanic membrane, ear canal and external ear normal. No drainage. No middle ear effusion. There is impacted cerumen. Tympanic membrane is not bulging. Tympanic membrane has normal mobility. Nose: No mucosal edema or rhinorrhea. Mouth/Throat:      Lips: Pink. Mouth: Mucous membranes are moist.      Tongue: No lesions. Palate: No mass. Pharynx: Uvula midline. Eyes:      Pupils: Pupils are equal, round, and reactive to light. Neck:      Thyroid: No thyroid mass or thyromegaly. Trachea: Trachea and phonation normal.   Cardiovascular:      Pulses: Normal pulses. Pulmonary:      Effort: Pulmonary effort is normal. No accessory muscle usage or respiratory distress. Breath sounds: No stridor. Musculoskeletal:      Cervical back: Full passive range of motion without pain. Lymphadenopathy:      Head:      Right side of head: No submental or submandibular adenopathy. Left side of head: No submental or submandibular adenopathy. Cervical: No cervical adenopathy. Right cervical: No superficial, deep or posterior cervical adenopathy. Left cervical: No superficial, deep or posterior cervical adenopathy. Skin:     General: Skin is warm and dry. Neurological:      Mental Status: He is alert and oriented to person, place, and time. Cranial Nerves: No cranial nerve deficit.       Coordination: Coordination normal. Gait: Gait normal.   Psychiatric:         Thought Content: Thought content normal.           Procedure     Removal Impacted Cerumen    An operating microscope was utilized to visualize the external auditory canals using a 3mm speculum. Cerumen was removed with curettes and dalal suctions on both sides. The tympanic membrane is intact. No fluid visualized in the middle ear. No complications. Assessment and Plan     1. Cough  -Intermittent cough triggered by itching sensation in his throat  -Discussed allergy may be playing a role, improved once cutting out dairy from morning coffee  - cetirizine (ZYRTEC) 10 MG tablet; Take 1 tablet by mouth daily  Dispense: 30 tablet; Refill: 11    2. Bilateral impacted cerumen  -Removed without complication      Symptoms persist follow-up in 1 month we will plan for an office laryngoscopy at that time    Mini Oquendo DO  2/24/22      Portions of this note were dictated using Dragon.  There may be linguistic errors secondary to the use of this program.

## 2022-04-11 RX ORDER — TAMSULOSIN HYDROCHLORIDE 0.4 MG/1
CAPSULE ORAL
Qty: 30 CAPSULE | Refills: 12 | Status: SHIPPED | OUTPATIENT
Start: 2022-04-11

## 2022-04-11 RX ORDER — CHOLECALCIFEROL (VITAMIN D3) 125 MCG
CAPSULE ORAL
Qty: 30 TABLET | Refills: 12 | Status: SHIPPED | OUTPATIENT
Start: 2022-04-11

## 2022-05-31 ENCOUNTER — OFFICE VISIT (OUTPATIENT)
Dept: ENT CLINIC | Age: 47
End: 2022-05-31
Payer: MEDICAID

## 2022-05-31 VITALS — HEIGHT: 72 IN | RESPIRATION RATE: 16 BRPM | WEIGHT: 249 LBS | TEMPERATURE: 97.8 F | BODY MASS INDEX: 33.72 KG/M2

## 2022-05-31 DIAGNOSIS — H60.391 OTHER INFECTIVE ACUTE OTITIS EXTERNA OF RIGHT EAR: Primary | ICD-10-CM

## 2022-05-31 DIAGNOSIS — H61.22 IMPACTED CERUMEN OF LEFT EAR: ICD-10-CM

## 2022-05-31 PROCEDURE — 1036F TOBACCO NON-USER: CPT | Performed by: OTOLARYNGOLOGY

## 2022-05-31 PROCEDURE — G8427 DOCREV CUR MEDS BY ELIG CLIN: HCPCS | Performed by: OTOLARYNGOLOGY

## 2022-05-31 PROCEDURE — G8417 CALC BMI ABV UP PARAM F/U: HCPCS | Performed by: OTOLARYNGOLOGY

## 2022-05-31 PROCEDURE — 4130F TOPICAL PREP RX AOE: CPT | Performed by: OTOLARYNGOLOGY

## 2022-05-31 PROCEDURE — 99214 OFFICE O/P EST MOD 30 MIN: CPT | Performed by: OTOLARYNGOLOGY

## 2022-05-31 RX ORDER — CIPROFLOXACIN AND DEXAMETHASONE 3; 1 MG/ML; MG/ML
4 SUSPENSION/ DROPS AURICULAR (OTIC) 2 TIMES DAILY
Qty: 1 EACH | Refills: 0 | Status: SHIPPED | OUTPATIENT
Start: 2022-05-31 | End: 2022-06-10

## 2022-05-31 ASSESSMENT — ENCOUNTER SYMPTOMS
EYE ITCHING: 0
TROUBLE SWALLOWING: 0
FACIAL SWELLING: 0
COUGH: 0
VOICE CHANGE: 0
APNEA: 0
SORE THROAT: 0
SHORTNESS OF BREATH: 0
SINUS PRESSURE: 0

## 2022-05-31 NOTE — PROGRESS NOTES
Rika Palmer [address], [address], [address] [place] [address]  P: [phone]       Patient     Erendira Manuel  1975    ChiefComplaint     Chief Complaint   Patient presents with    Ear Problem     Patient states that both ears need cleaned, but the right ear is in a lot of pain       History of Present Illness     Ralf Caldera is a 77-year-old male here today for evaluation of left-sided hearing loss and ear fullness secondary to cerumen impaction right-sided ear pain. Ear pain started Friday and has progressed since. Has been taking ibuprofen with minimal relief.     Past Medical History     Past Medical History:   Diagnosis Date    HLD (hyperlipidemia)     IFG (impaired fasting glucose)     PRACHI (obstructive sleep apnea)     nasal pillow cpap, Dr Ivett Giron       Past Surgical History     Past Surgical History:   Procedure Laterality Date    CYST REMOVAL N/A 2016    3 cysts on scalp    HEMORRHOID SURGERY      HERNIA REPAIR      B/L inguinal       Family History     Family History   Problem Relation Age of Onset    Hypertension Mother     Other Father         Chronic kidney infx,        Social History     Social History     Tobacco Use    Smoking status: Former Smoker     Packs/day: 0.10     Types: Cigars     Quit date: 3/1/2020     Years since quittin.2    Smokeless tobacco: Never Used    Tobacco comment: states 1 cigar a week when fishing   Vaping Use    Vaping Use: Never used   Substance Use Topics    Alcohol use: No     Alcohol/week: 0.0 standard drinks    Drug use: No        Allergies     No Known Allergies    Medications     Current Outpatient Medications   Medication Sig Dispense Refill    ciprofloxacin-dexamethasone (CIPRODEX) 0.3-0.1 % otic suspension Place 4 drops into the right ear 2 times daily for 10 days 1 each 0    tamsulosin (FLOMAX) 0.4 MG capsule TAKE 1 CAPSULE BY MOUTH EVERY DAY 30 capsule 12    Cholecalciferol (VITAMIN D3) 50 MCG (2000 UT) TABS TAKE 1 TABLET BY MOUTH EVERY DAY 30 tablet 12    Multiple Vitamin (THERA-TABS) TABS TAKE 1 TABLET BY MOUTH EVERY DAY 30 tablet 12    fenofibrate (TRICOR) 145 MG tablet Take 1 tablet by mouth daily 30 tablet 12    ibuprofen (ADVIL;MOTRIN) 800 MG tablet TAKE 1 TABLET BY MOUTH EVERY 8 HOURS AS NEEDED FOR PAIN 90 tablet 3    omega-3 acid ethyl esters (LOVAZA) 1 g capsule Take 1 capsule by mouth 2 times daily 60 capsule 12    fluticasone (FLONASE) 50 MCG/ACT nasal spray SPRAY 2 SPRAYS INTO EACH NOSTRIL EVERY DAY 1 Bottle 5    clobetasol (TEMOVATE) 0.05 % ointment APPLY TO AFFECTED AREA TWICE A DAY 15 g 1     No current facility-administered medications for this visit. Review of Systems     Review of Systems   Constitutional: Negative for appetite change, chills, fatigue, fever and unexpected weight change. HENT: Positive for ear pain and hearing loss. Negative for congestion, ear discharge, facial swelling, nosebleeds, postnasal drip, sinus pressure, sneezing, sore throat, tinnitus, trouble swallowing and voice change. Eyes: Negative for itching. Respiratory: Negative for apnea, cough and shortness of breath. Endocrine: Negative for cold intolerance and heat intolerance. Musculoskeletal: Negative for myalgias and neck pain. Skin: Negative for rash. Allergic/Immunologic: Negative for environmental allergies. Neurological: Negative for dizziness and headaches. Psychiatric/Behavioral: Negative for confusion, decreased concentration and sleep disturbance. PhysicalExam     Vitals:    05/31/22 1433   Resp: 16   Temp: 97.8 °F (36.6 °C)   TempSrc: Infrared   Weight: 249 lb (112.9 kg)   Height: 6' (1.829 m)       Physical Exam  Constitutional:       General: He is not in acute distress. Appearance: He is well-developed. HENT:      Head: Normocephalic and atraumatic. Right Ear: Ear canal and external ear normal. Drainage, swelling and tenderness present. Left Ear: Tympanic membrane, ear canal and external ear normal. No drainage. No middle ear effusion. There is impacted cerumen (Removed without complication). Tympanic membrane is not bulging. Tympanic membrane has normal mobility. Ears:      Comments: EAC swollen shut. Otowick placed     Nose: No mucosal edema or rhinorrhea. Mouth/Throat:      Lips: Pink. Mouth: Mucous membranes are moist.      Tongue: No lesions. Palate: No mass. Pharynx: Uvula midline. Eyes:      Pupils: Pupils are equal, round, and reactive to light. Neck:      Thyroid: No thyroid mass or thyromegaly. Trachea: Trachea and phonation normal.   Cardiovascular:      Pulses: Normal pulses. Pulmonary:      Effort: Pulmonary effort is normal. No accessory muscle usage or respiratory distress. Breath sounds: No stridor. Musculoskeletal:      Cervical back: Full passive range of motion without pain. Lymphadenopathy:      Head:      Right side of head: No submental or submandibular adenopathy. Left side of head: No submental or submandibular adenopathy. Cervical: No cervical adenopathy. Right cervical: No superficial, deep or posterior cervical adenopathy. Left cervical: No superficial, deep or posterior cervical adenopathy. Skin:     General: Skin is warm and dry. Neurological:      Mental Status: He is alert and oriented to person, place, and time. Cranial Nerves: No cranial nerve deficit. Coordination: Coordination normal.      Gait: Gait normal.   Psychiatric:         Thought Content: Thought content normal.           Assessment and Plan     1. Other infective acute otitis externa of right ear  -Wick placed, start Ciprodex  - ciprofloxacin-dexamethasone (CIPRODEX) 0.3-0.1 % otic suspension; Place 4 drops into the right ear 2 times daily for 10 days  Dispense: 1 each; Refill: 0    2.   Cerumen impaction left  -Removed without complication      Follow-up Friday    Peg Fetch DO Camilo  5/31/22      Portions of this note were dictated using Dragon.  There may be linguistic errors secondary to the use of this program.

## 2022-06-03 ENCOUNTER — OFFICE VISIT (OUTPATIENT)
Dept: ENT CLINIC | Age: 47
End: 2022-06-03
Payer: MEDICAID

## 2022-06-03 VITALS — HEIGHT: 72 IN | BODY MASS INDEX: 33.72 KG/M2 | WEIGHT: 249 LBS | TEMPERATURE: 97.8 F

## 2022-06-03 DIAGNOSIS — H60.391 OTHER INFECTIVE ACUTE OTITIS EXTERNA OF RIGHT EAR: Primary | ICD-10-CM

## 2022-06-03 PROCEDURE — G8427 DOCREV CUR MEDS BY ELIG CLIN: HCPCS | Performed by: OTOLARYNGOLOGY

## 2022-06-03 PROCEDURE — 1036F TOBACCO NON-USER: CPT | Performed by: OTOLARYNGOLOGY

## 2022-06-03 PROCEDURE — G8417 CALC BMI ABV UP PARAM F/U: HCPCS | Performed by: OTOLARYNGOLOGY

## 2022-06-03 PROCEDURE — 4130F TOPICAL PREP RX AOE: CPT | Performed by: OTOLARYNGOLOGY

## 2022-06-03 PROCEDURE — 99213 OFFICE O/P EST LOW 20 MIN: CPT | Performed by: OTOLARYNGOLOGY

## 2022-06-03 ASSESSMENT — ENCOUNTER SYMPTOMS
VOICE CHANGE: 0
SHORTNESS OF BREATH: 0
APNEA: 0
SINUS PRESSURE: 0
COUGH: 0
SORE THROAT: 0
TROUBLE SWALLOWING: 0
EYE ITCHING: 0
FACIAL SWELLING: 0

## 2022-06-03 NOTE — PROGRESS NOTES
Rika Palmer [address], [address], [address] [facility]  P: [phone]       Patient     Liang Ballard  1975    ChiefComplaint     Chief Complaint   Patient presents with    Follow-up     Patient is here for a 3 day follow up from ear infection. Patient states he is much better but is still feeling pressure in ear , more in the right ear. He states it feels like something is in both ears, right side is the worst.       History of Present Illness     Kyle Moise is a 50-year-old male here today for follow-up regarding right acute otitis externa. Reports improvement in pain. Hearing remains muffled, which is in place. Using eardrops without issue.     Past Medical History     Past Medical History:   Diagnosis Date    HLD (hyperlipidemia)     IFG (impaired fasting glucose)     PRACHI (obstructive sleep apnea)     nasal pillow cpap, Dr Sancho Sabillon       Past Surgical History     Past Surgical History:   Procedure Laterality Date    CYST REMOVAL N/A 2016    3 cysts on scalp    HEMORRHOID SURGERY      HERNIA REPAIR      B/L inguinal       Family History     Family History   Problem Relation Age of Onset    Hypertension Mother     Other Father         Chronic kidney infx,        Social History     Social History     Tobacco Use    Smoking status: Former Smoker     Packs/day: 0.10     Types: Cigars     Quit date: 3/1/2020     Years since quittin.2    Smokeless tobacco: Never Used    Tobacco comment: states 1 cigar a week when fishing   Vaping Use    Vaping Use: Never used   Substance Use Topics    Alcohol use: No     Alcohol/week: 0.0 standard drinks    Drug use: No        Allergies     No Known Allergies    Medications     Current Outpatient Medications   Medication Sig Dispense Refill    ciprofloxacin-dexamethasone (CIPRODEX) 0.3-0.1 % otic suspension Place 4 drops into the right ear 2 times daily for 10 days 1 each 0    tamsulosin (FLOMAX) 0.4 MG capsule TAKE 1 CAPSULE BY MOUTH EVERY DAY 30 capsule 12    Cholecalciferol (VITAMIN D3) 50 MCG (2000 UT) TABS TAKE 1 TABLET BY MOUTH EVERY DAY 30 tablet 12    Multiple Vitamin (THERA-TABS) TABS TAKE 1 TABLET BY MOUTH EVERY DAY 30 tablet 12    fenofibrate (TRICOR) 145 MG tablet Take 1 tablet by mouth daily 30 tablet 12    ibuprofen (ADVIL;MOTRIN) 800 MG tablet TAKE 1 TABLET BY MOUTH EVERY 8 HOURS AS NEEDED FOR PAIN 90 tablet 3    omega-3 acid ethyl esters (LOVAZA) 1 g capsule Take 1 capsule by mouth 2 times daily 60 capsule 12    fluticasone (FLONASE) 50 MCG/ACT nasal spray SPRAY 2 SPRAYS INTO EACH NOSTRIL EVERY DAY 1 Bottle 5    clobetasol (TEMOVATE) 0.05 % ointment APPLY TO AFFECTED AREA TWICE A DAY 15 g 1     No current facility-administered medications for this visit. Review of Systems     Review of Systems   Constitutional: Negative for appetite change, chills, fatigue, fever and unexpected weight change. HENT: Positive for hearing loss. Negative for congestion, ear discharge, ear pain, facial swelling, nosebleeds, postnasal drip, sinus pressure, sneezing, sore throat, tinnitus, trouble swallowing and voice change. Eyes: Negative for itching. Respiratory: Negative for apnea, cough and shortness of breath. Endocrine: Negative for cold intolerance and heat intolerance. Musculoskeletal: Negative for myalgias and neck pain. Skin: Negative for rash. Allergic/Immunologic: Negative for environmental allergies. Neurological: Negative for dizziness and headaches. Psychiatric/Behavioral: Negative for confusion, decreased concentration and sleep disturbance. PhysicalExam     Vitals:    06/03/22 1137   Temp: 97.8 °F (36.6 °C)   TempSrc: Infrared   Weight: 249 lb (112.9 kg)   Height: 6' (1.829 m)       Constitutional:       Appearance: Normal appearance. HENT:      Head: Normocephalic. Nose: Nose normal.      Ears: Right otowick removed. Left EAC suctioned free of debris. Significant improvement in edema. TM intact. Left EAC clear, TM intact  Eyes:      Extraocular Movements: Extraocular movements intact. Neck:      Musculoskeletal: Normal range of motion. Neurological:      General: No focal deficit present. Mental Status: She is alert and oriented to person, place, and time. Psychiatric:         Mood and Affect: Mood normal.         Behavior: Behavior normal.         Thought Content: Thought content normal.         Assessment and Plan     1. Other infective acute otitis externa of right ear  -Improving  -Continue Ciprodex for total of 10 days  -Advised against plucking of ear hairs, discussed trimming    Follow-up in 4 months for routine cerumen removal or sooner if needed        Mary Beth Padgett DO  6/3/22      Portions of this note were dictated using Dragon.  There may be linguistic errors secondary to the use of this program.

## 2022-07-14 ENCOUNTER — OFFICE VISIT (OUTPATIENT)
Dept: FAMILY MEDICINE CLINIC | Age: 47
End: 2022-07-14
Payer: MEDICAID

## 2022-07-14 VITALS
SYSTOLIC BLOOD PRESSURE: 110 MMHG | OXYGEN SATURATION: 97 % | DIASTOLIC BLOOD PRESSURE: 84 MMHG | HEIGHT: 72 IN | WEIGHT: 245 LBS | BODY MASS INDEX: 33.18 KG/M2 | HEART RATE: 86 BPM

## 2022-07-14 DIAGNOSIS — R53.83 FATIGUE, UNSPECIFIED TYPE: ICD-10-CM

## 2022-07-14 DIAGNOSIS — Z00.00 ANNUAL PHYSICAL EXAM: ICD-10-CM

## 2022-07-14 DIAGNOSIS — D64.9 LOW HEMOGLOBIN: Primary | ICD-10-CM

## 2022-07-14 DIAGNOSIS — E78.2 MIXED HYPERLIPIDEMIA: ICD-10-CM

## 2022-07-14 DIAGNOSIS — Z12.11 COLON CANCER SCREENING: ICD-10-CM

## 2022-07-14 DIAGNOSIS — Z00.00 ANNUAL PHYSICAL EXAM: Primary | ICD-10-CM

## 2022-07-14 DIAGNOSIS — R73.01 IFG (IMPAIRED FASTING GLUCOSE): ICD-10-CM

## 2022-07-14 DIAGNOSIS — R30.0 DYSURIA: ICD-10-CM

## 2022-07-14 LAB
A/G RATIO: 1.8 (ref 1.1–2.2)
ALBUMIN SERPL-MCNC: 4.4 G/DL (ref 3.4–5)
ALP BLD-CCNC: 66 U/L (ref 40–129)
ALT SERPL-CCNC: 22 U/L (ref 10–40)
ANION GAP SERPL CALCULATED.3IONS-SCNC: 11 MMOL/L (ref 3–16)
AST SERPL-CCNC: 21 U/L (ref 15–37)
BACTERIA: ABNORMAL /HPF
BASOPHILS ABSOLUTE: 0 K/UL (ref 0–0.2)
BASOPHILS RELATIVE PERCENT: 0.6 %
BILIRUB SERPL-MCNC: 0.5 MG/DL (ref 0–1)
BILIRUBIN URINE: NEGATIVE
BLOOD, URINE: NEGATIVE
BUN BLDV-MCNC: 14 MG/DL (ref 7–20)
CALCIUM SERPL-MCNC: 9.2 MG/DL (ref 8.3–10.6)
CHLORIDE BLD-SCNC: 105 MMOL/L (ref 99–110)
CHOLESTEROL, TOTAL: 181 MG/DL (ref 0–199)
CLARITY: ABNORMAL
CO2: 24 MMOL/L (ref 21–32)
COLOR: YELLOW
CREAT SERPL-MCNC: 0.9 MG/DL (ref 0.9–1.3)
EOSINOPHILS ABSOLUTE: 0.1 K/UL (ref 0–0.6)
EOSINOPHILS RELATIVE PERCENT: 1.2 %
EPITHELIAL CELLS, UA: 0 /HPF (ref 0–5)
GFR AFRICAN AMERICAN: >60
GFR NON-AFRICAN AMERICAN: >60
GLUCOSE BLD-MCNC: 101 MG/DL (ref 70–99)
GLUCOSE URINE: NEGATIVE MG/DL
HCT VFR BLD CALC: 37.5 % (ref 40.5–52.5)
HDLC SERPL-MCNC: 32 MG/DL (ref 40–60)
HEMOGLOBIN: 13.3 G/DL (ref 13.5–17.5)
HYALINE CASTS: 0 /LPF (ref 0–8)
KETONES, URINE: NEGATIVE MG/DL
LDL CHOLESTEROL CALCULATED: 120 MG/DL
LEUKOCYTE ESTERASE, URINE: NEGATIVE
LYMPHOCYTES ABSOLUTE: 1.4 K/UL (ref 1–5.1)
LYMPHOCYTES RELATIVE PERCENT: 23.6 %
MCH RBC QN AUTO: 29.7 PG (ref 26–34)
MCHC RBC AUTO-ENTMCNC: 35.4 G/DL (ref 31–36)
MCV RBC AUTO: 84.1 FL (ref 80–100)
MICROSCOPIC EXAMINATION: YES
MONOCYTES ABSOLUTE: 0.3 K/UL (ref 0–1.3)
MONOCYTES RELATIVE PERCENT: 4.8 %
NEUTROPHILS ABSOLUTE: 4.3 K/UL (ref 1.7–7.7)
NEUTROPHILS RELATIVE PERCENT: 69.8 %
NITRITE, URINE: NEGATIVE
PDW BLD-RTO: 12.9 % (ref 12.4–15.4)
PH UA: 7 (ref 5–8)
PLATELET # BLD: 213 K/UL (ref 135–450)
PMV BLD AUTO: 8.3 FL (ref 5–10.5)
POTASSIUM SERPL-SCNC: 4.1 MMOL/L (ref 3.5–5.1)
PROTEIN UA: NEGATIVE MG/DL
RBC # BLD: 4.46 M/UL (ref 4.2–5.9)
RBC UA: 1 /HPF (ref 0–4)
SODIUM BLD-SCNC: 140 MMOL/L (ref 136–145)
SPECIFIC GRAVITY UA: 1.02 (ref 1–1.03)
TOTAL PROTEIN: 6.8 G/DL (ref 6.4–8.2)
TRIGL SERPL-MCNC: 144 MG/DL (ref 0–150)
TSH REFLEX FT4: 1.15 UIU/ML (ref 0.27–4.2)
URINE TYPE: ABNORMAL
UROBILINOGEN, URINE: 0.2 E.U./DL
VLDLC SERPL CALC-MCNC: 29 MG/DL
WBC # BLD: 6.1 K/UL (ref 4–11)
WBC UA: 1 /HPF (ref 0–5)

## 2022-07-14 PROCEDURE — 99396 PREV VISIT EST AGE 40-64: CPT | Performed by: FAMILY MEDICINE

## 2022-07-14 ASSESSMENT — PATIENT HEALTH QUESTIONNAIRE - PHQ9
SUM OF ALL RESPONSES TO PHQ QUESTIONS 1-9: 0
1. LITTLE INTEREST OR PLEASURE IN DOING THINGS: 0
2. FEELING DOWN, DEPRESSED OR HOPELESS: 0
SUM OF ALL RESPONSES TO PHQ9 QUESTIONS 1 & 2: 0
SUM OF ALL RESPONSES TO PHQ QUESTIONS 1-9: 0

## 2022-07-14 NOTE — PROGRESS NOTES
Assessment/Plan:    Maciej Lee was seen today for annual exam.    Diagnoses and all orders for this visit:    Annual physical exam  -     Lipid Panel; Future  -     Comprehensive Metabolic Panel; Future    Colon cancer screening  -     Fecal DNA Colorectal cancer screening (Cologuard)    Mixed hyperlipidemia  -     Lipid Panel; Future  -     Comprehensive Metabolic Panel; Future   Pt conts fenofibrate, has been off otc fish oil x 1 mo but is wiling to resume if trig high. Fatigue, unspecified type  -     Comprehensive Metabolic Panel; Future  -     CBC with Auto Differential; Future  -     TSH with Reflex to FT4; Future    Dysuria  -     Urinalysis with Microscopic   Cont flomax      There are no Patient Instructions on file for this visit. Patient: Elvis Casas is a 51 y.o.male who presents today with the following Chief Complaint(s):  Chief Complaint   Patient presents with    Annual Exam         HPI: Pt with HLD, PRACHI on cpap ([facility]), hand dermatitis, hx of ear wax impaction, bph, anal fissue/hemorrhoids has been feeling well. Is on fish oil 1000 mg from [employer] that has no pork gelatin, unlike lovaza. Has been off of it x 1 mo but is willing to resume if needed. Pt conts fenofibrate. Conts on flomax. No nocturia usually. May have 1 episode/noc if drinks water at hs. Pt reports remote uti, requests urine eval.    Conts to see Dr Jarvis Neff q 4 mo for wax removal. Recent OM in 6/2022 better after ciiprodex. Feels tired much of the time, moreso in recent months. Sleeps 6-7 hrs per noc. Uses cpap qhs. Sees [facility] sleep specialist q yr. 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     Lab Results   Component Value Date    LABA1C 5.4 06/21/2021    LABA1C 5.4 10/22/2020    LABA1C 5.5 08/20/2017     :      Current Outpatient Medications   Medication Sig Dispense Refill    tamsulosin (FLOMAX) 0.4 MG capsule TAKE 1 CAPSULE BY MOUTH EVERY DAY 30 capsule 12    Cholecalciferol (VITAMIN D3) 50 MCG (2000 UT) TABS TAKE 1 TABLET BY MOUTH EVERY DAY 30 tablet 12    Multiple Vitamin (THERA-TABS) TABS TAKE 1 TABLET BY MOUTH EVERY DAY 30 tablet 12    fenofibrate (TRICOR) 145 MG tablet Take 1 tablet by mouth daily 30 tablet 12    ibuprofen (ADVIL;MOTRIN) 800 MG tablet TAKE 1 TABLET BY MOUTH EVERY 8 HOURS AS NEEDED FOR PAIN 90 tablet 3    omega-3 acid ethyl esters (LOVAZA) 1 g capsule Take 1 capsule by mouth 2 times daily 60 capsule 12    fluticasone (FLONASE) 50 MCG/ACT nasal spray SPRAY 2 SPRAYS INTO EACH NOSTRIL EVERY DAY 1 Bottle 5    clobetasol (TEMOVATE) 0.05 % ointment APPLY TO AFFECTED AREA TWICE A DAY 15 g 1     No current facility-administered medications for this visit. Patient's past medical history,surgical history, family history, medications,  and allergies  were all reviewed and updated as appropriate today. Review of Systems  abv    Physical Exam  Constitutional:       General: He is not in acute distress. Appearance: Normal appearance. He is well-developed. He is not ill-appearing. HENT:      Head: Normocephalic and atraumatic. Right Ear: Tympanic membrane, ear canal and external ear normal.      Left Ear: Tympanic membrane, ear canal and external ear normal.      Mouth/Throat:      Pharynx: Oropharynx is clear. No oropharyngeal exudate. Eyes:      General: No scleral icterus. Right eye: No discharge. Left eye: No discharge. Extraocular Movements: Extraocular movements intact. Conjunctiva/sclera: Conjunctivae normal.   Neck:      Vascular: No carotid bruit. Comments: Neg TMG  Cardiovascular:      Rate and Rhythm: Normal rate and regular rhythm. Pulses: Normal pulses. Heart sounds: Normal heart sounds. No murmur heard. Pulmonary:      Effort: Pulmonary effort is normal. No respiratory distress. Breath sounds: Normal breath sounds.    Abdominal:      General: Bowel sounds are normal. There is

## 2022-07-15 ENCOUNTER — TELEPHONE (OUTPATIENT)
Dept: FAMILY MEDICINE CLINIC | Age: 47
End: 2022-07-15

## 2022-07-15 DIAGNOSIS — D64.9 LOW HEMOGLOBIN: ICD-10-CM

## 2022-07-15 DIAGNOSIS — R73.01 IFG (IMPAIRED FASTING GLUCOSE): ICD-10-CM

## 2022-07-15 LAB
ESTIMATED AVERAGE GLUCOSE: 99.7 MG/DL
FERRITIN: 225 NG/ML (ref 30–400)
FOLATE: >20 NG/ML (ref 4.78–24.2)
HBA1C MFR BLD: 5.1 %
IRON SATURATION: 26 % (ref 20–50)
IRON: 96 UG/DL (ref 59–158)
TOTAL IRON BINDING CAPACITY: 372 UG/DL (ref 260–445)
VITAMIN B-12: 373 PG/ML (ref 211–911)

## 2022-07-21 RX ORDER — FENOFIBRATE 160 MG/1
TABLET ORAL
Qty: 90 TABLET | Refills: 3 | OUTPATIENT
Start: 2022-07-21

## 2022-07-21 RX ORDER — FENOFIBRATE 145 MG/1
145 TABLET, COATED ORAL DAILY
Qty: 30 TABLET | Refills: 12 | Status: SHIPPED | OUTPATIENT
Start: 2022-07-21

## 2022-07-24 ENCOUNTER — PATIENT MESSAGE (OUTPATIENT)
Dept: FAMILY MEDICINE CLINIC | Age: 47
End: 2022-07-24

## 2022-10-07 ENCOUNTER — OFFICE VISIT (OUTPATIENT)
Dept: ENT CLINIC | Age: 47
End: 2022-10-07
Payer: MEDICAID

## 2022-10-07 VITALS — TEMPERATURE: 97.8 F | WEIGHT: 245 LBS | BODY MASS INDEX: 33.18 KG/M2 | HEIGHT: 72 IN | RESPIRATION RATE: 16 BRPM

## 2022-10-07 DIAGNOSIS — H61.23 BILATERAL IMPACTED CERUMEN: Primary | ICD-10-CM

## 2022-10-07 PROCEDURE — 69210 REMOVE IMPACTED EAR WAX UNI: CPT | Performed by: OTOLARYNGOLOGY

## 2022-10-07 NOTE — PROGRESS NOTES
[facility] [address], [address]  [place], [address]  P: [phone]       Patient     Leeann Sol  1975    ChiefComplaint     Chief Complaint   Patient presents with    Ear Problem     States that he is here to follow up on bilat ears, states that he is feeling better       History of Present Illness     Marlyn Boogie is a 25-year-old male here today for cerumen removal.  Hearing began feeling muffled 2 to 3 weeks ago. Past Medical History     Past Medical History:   Diagnosis Date    HLD (hyperlipidemia)     IFG (impaired fasting glucose)     PRACHI (obstructive sleep apnea)     nasal pillow cpap, Dr Denis Cabello       Past Surgical History     Past Surgical History:   Procedure Laterality Date    CYST REMOVAL N/A 2016    3 cysts on scalp    HEMORRHOID SURGERY      HERNIA REPAIR      B/L inguinal       Family History     Family History   Problem Relation Age of Onset    Hypertension Mother     Other Father         Chronic kidney infx,        Social History     Social History     Tobacco Use    Smoking status: Former     Packs/day: 0.10     Types: Cigars, Cigarettes     Quit date: 3/1/2020     Years since quittin.6    Smokeless tobacco: Never    Tobacco comments:     states 1 cigar a week when fishing   Vaping Use    Vaping Use: Never used   Substance Use Topics    Alcohol use: No     Alcohol/week: 0.0 standard drinks    Drug use: No        Allergies     No Known Allergies    Medications     Current Outpatient Medications   Medication Sig Dispense Refill    fenofibrate (TRICOR) 145 MG tablet Take 1 tablet by mouth in the morning.  30 tablet 12    tamsulosin (FLOMAX) 0.4 MG capsule TAKE 1 CAPSULE BY MOUTH EVERY DAY 30 capsule 12    Cholecalciferol (VITAMIN D3) 50 MCG ( UT) TABS TAKE 1 TABLET BY MOUTH EVERY DAY 30 tablet 12    Multiple Vitamin (THERA-TABS) TABS TAKE 1 TABLET BY MOUTH EVERY DAY 30 tablet 12    ibuprofen (ADVIL;MOTRIN) 800 MG tablet TAKE 1 TABLET BY MOUTH EVERY 8 HOURS AS NEEDED FOR PAIN 90 tablet 3    fluticasone (FLONASE) 50 MCG/ACT nasal spray SPRAY 2 SPRAYS INTO EACH NOSTRIL EVERY DAY 1 Bottle 5    clobetasol (TEMOVATE) 0.05 % ointment APPLY TO AFFECTED AREA TWICE A DAY 15 g 1     No current facility-administered medications for this visit. Review of Systems     + Muffled hearing      PhysicalExam     Vitals:    10/07/22 1258   Resp: 16   Temp: 97.8 °F (36.6 °C)   TempSrc: Infrared   Weight: 245 lb (111.1 kg)   Height: 6' (1.829 m)       Bilateral cerumen impactions      Procedure     Removal Impacted Cerumen    An operating microscope was utilized to visualize the external auditory canals using a 3mm speculum. Cerumen was removed with curettes and dalal suctions on both sides. The tympanic membrane is intact. No fluid visualized in the middle ear. No complications. Assessment and Plan     1. Bilateral impacted cerumen  -Removed without complication, hearing returned to baseline    Follow-up in 4 months      [facility]   10/7/22      Portions of this note were dictated using Dragon.  There may be linguistic errors secondary to the use of this program.

## 2022-10-11 ENCOUNTER — TELEPHONE (OUTPATIENT)
Dept: FAMILY MEDICINE CLINIC | Age: 47
End: 2022-10-11

## 2022-10-11 NOTE — TELEPHONE ENCOUNTER
----- Message from Angela Calderon sent at 10/11/2022  2:38 PM EDT -----  Subject: Appointment Request    Reason for Call: Established Patient Appointment needed: Flu Shot    QUESTIONS    Reason for appointment request? No appointments available during search     Additional Information for Provider? Patient would like to schedule FLU   SHOT appt.  Please contact to assist.   ---------------------------------------------------------------------------  --------------  Ihsan RIOAJS  [phone]; OK to leave message on voicemail  ---------------------------------------------------------------------------  --------------  SCRIPT ANSWERS  COVID Screen: Placido Spivey

## 2022-10-14 ENCOUNTER — NURSE ONLY (OUTPATIENT)
Dept: FAMILY MEDICINE CLINIC | Age: 47
End: 2022-10-14
Payer: MEDICAID

## 2022-10-14 DIAGNOSIS — Z23 NEED FOR PROPHYLACTIC VACCINATION AND INOCULATION AGAINST INFLUENZA: Primary | ICD-10-CM

## 2022-10-14 PROCEDURE — 90471 IMMUNIZATION ADMIN: CPT | Performed by: FAMILY MEDICINE

## 2022-10-14 PROCEDURE — 90674 CCIIV4 VAC NO PRSV 0.5 ML IM: CPT | Performed by: FAMILY MEDICINE

## 2022-10-18 RX ORDER — MULTIVITAMIN,THERAPEUTIC
TABLET ORAL
Qty: 30 TABLET | Refills: 12 | Status: SHIPPED | OUTPATIENT
Start: 2022-10-18

## 2022-10-18 NOTE — TELEPHONE ENCOUNTER
Last Office Visit  -  07/14/2022  Next Office Visit  -  n/a    Last Filled  -    Last UDS -    Contract -

## 2022-11-29 RX ORDER — FENOFIBRATE 160 MG/1
TABLET ORAL
Qty: 90 TABLET | Refills: 3 | OUTPATIENT
Start: 2022-11-29

## 2022-12-22 ENCOUNTER — OFFICE VISIT (OUTPATIENT)
Dept: ENT CLINIC | Age: 47
End: 2022-12-22
Payer: MEDICAID

## 2022-12-22 VITALS
TEMPERATURE: 97.5 F | SYSTOLIC BLOOD PRESSURE: 139 MMHG | BODY MASS INDEX: 33.18 KG/M2 | HEIGHT: 72 IN | DIASTOLIC BLOOD PRESSURE: 99 MMHG | OXYGEN SATURATION: 98 % | HEART RATE: 89 BPM | WEIGHT: 245 LBS

## 2022-12-22 DIAGNOSIS — H61.23 BILATERAL IMPACTED CERUMEN: Primary | ICD-10-CM

## 2022-12-22 DIAGNOSIS — J06.9 VIRAL UPPER RESPIRATORY TRACT INFECTION: ICD-10-CM

## 2022-12-22 PROCEDURE — G8427 DOCREV CUR MEDS BY ELIG CLIN: HCPCS | Performed by: OTOLARYNGOLOGY

## 2022-12-22 PROCEDURE — G8482 FLU IMMUNIZE ORDER/ADMIN: HCPCS | Performed by: OTOLARYNGOLOGY

## 2022-12-22 PROCEDURE — 69210 REMOVE IMPACTED EAR WAX UNI: CPT | Performed by: OTOLARYNGOLOGY

## 2022-12-22 PROCEDURE — G8417 CALC BMI ABV UP PARAM F/U: HCPCS | Performed by: OTOLARYNGOLOGY

## 2022-12-22 PROCEDURE — 99212 OFFICE O/P EST SF 10 MIN: CPT | Performed by: OTOLARYNGOLOGY

## 2022-12-22 PROCEDURE — 1036F TOBACCO NON-USER: CPT | Performed by: OTOLARYNGOLOGY

## 2022-12-22 ASSESSMENT — ENCOUNTER SYMPTOMS
SORE THROAT: 0
SHORTNESS OF BREATH: 0
EYE ITCHING: 0
VOICE CHANGE: 0
SINUS PRESSURE: 1
APNEA: 0
TROUBLE SWALLOWING: 0
FACIAL SWELLING: 0
COUGH: 0

## 2022-12-22 NOTE — PROGRESS NOTES
Rika Palmer [address], [address], [address] [place] [address]  P: [phone]       Patient     Dexter Martinez  1975    ChiefComplaint     Chief Complaint   Patient presents with    Cerumen Impaction     Patient is here today for cerumen impaction. Patient states he needs both ears cleaned out. He states the right ear has been hurting a few days now. History of Present Illness     Sandeep Ruffin is a 69-year-old male here today for bilateral ear fullness and hearing loss. Believes cerumen impactions have recurred. Reports 3 days of nasal congestion and sinus pressure. Has not taken any medications. Past Medical History     Past Medical History:   Diagnosis Date    HLD (hyperlipidemia)     IFG (impaired fasting glucose)     PRACHI (obstructive sleep apnea)     nasal pillow cpap, Dr Amarilis Hensley       Past Surgical History     Past Surgical History:   Procedure Laterality Date    CYST REMOVAL N/A 2016    3 cysts on scalp    HEMORRHOID SURGERY      HERNIA REPAIR      B/L inguinal       Family History     Family History   Problem Relation Age of Onset    Hypertension Mother     Other Father         Chronic kidney infx,        Social History     Social History     Tobacco Use    Smoking status: Former     Packs/day: 0.10     Types: Cigars, Cigarettes     Quit date: 3/1/2020     Years since quittin.8    Smokeless tobacco: Never    Tobacco comments:     states 1 cigar a week when fishing   Vaping Use    Vaping Use: Never used   Substance Use Topics    Alcohol use: No     Alcohol/week: 0.0 standard drinks    Drug use: No        Allergies     No Known Allergies    Medications     Current Outpatient Medications   Medication Sig Dispense Refill    Multiple Vitamin (THERA-TABS) TABS TAKE 1 TABLET BY MOUTH EVERY DAY 30 tablet 12    fenofibrate (TRICOR) 145 MG tablet Take 1 tablet by mouth in the morning.  30 tablet 12    tamsulosin (FLOMAX) 0.4 MG capsule TAKE 1 CAPSULE BY MOUTH EVERY DAY 30 capsule 12    Cholecalciferol (VITAMIN D3) 50 MCG (2000 UT) TABS TAKE 1 TABLET BY MOUTH EVERY DAY 30 tablet 12    ibuprofen (ADVIL;MOTRIN) 800 MG tablet TAKE 1 TABLET BY MOUTH EVERY 8 HOURS AS NEEDED FOR PAIN 90 tablet 3    fluticasone (FLONASE) 50 MCG/ACT nasal spray SPRAY 2 SPRAYS INTO EACH NOSTRIL EVERY DAY 1 Bottle 5    clobetasol (TEMOVATE) 0.05 % ointment APPLY TO AFFECTED AREA TWICE A DAY 15 g 1     No current facility-administered medications for this visit. Review of Systems     Review of Systems   Constitutional:  Negative for appetite change, chills, fatigue, fever and unexpected weight change. HENT:  Positive for congestion, hearing loss and sinus pressure. Negative for ear discharge, ear pain, facial swelling, nosebleeds, postnasal drip, sneezing, sore throat, tinnitus, trouble swallowing and voice change. Eyes:  Negative for itching. Respiratory:  Negative for apnea, cough and shortness of breath. Endocrine: Negative for cold intolerance and heat intolerance. Musculoskeletal:  Negative for myalgias and neck pain. Skin:  Negative for rash. Allergic/Immunologic: Negative for environmental allergies. Neurological:  Negative for dizziness and headaches. Psychiatric/Behavioral:  Negative for confusion, decreased concentration and sleep disturbance. PhysicalExam     Vitals:    12/22/22 1516   BP: (!) 139/99   Site: Right Wrist   Position: Sitting   Pulse: 89   Temp: 97.5 °F (36.4 °C)   TempSrc: Infrared   SpO2: 98%   Weight: 245 lb (111.1 kg)   Height: 6' (1.829 m)       Physical Exam  Constitutional:       General: He is not in acute distress. Appearance: He is well-developed. HENT:      Head: Normocephalic and atraumatic. Right Ear: Ear canal and external ear normal. There is impacted cerumen. Left Ear: Ear canal and external ear normal. There is impacted cerumen. Nose: No mucosal edema or rhinorrhea.       Mouth/Throat:      Lips: Pink.      Mouth: Mucous membranes are moist.      Tongue: No lesions. Palate: No mass. Pharynx: Uvula midline. Eyes:      Pupils: Pupils are equal, round, and reactive to light. Neck:      Thyroid: No thyroid mass or thyromegaly. Trachea: Trachea and phonation normal.   Cardiovascular:      Pulses: Normal pulses. Pulmonary:      Effort: Pulmonary effort is normal. No accessory muscle usage or respiratory distress. Breath sounds: No stridor. Musculoskeletal:      Cervical back: Full passive range of motion without pain. Lymphadenopathy:      Head:      Right side of head: No submental or submandibular adenopathy. Left side of head: No submental or submandibular adenopathy. Cervical: No cervical adenopathy. Right cervical: No superficial, deep or posterior cervical adenopathy. Left cervical: No superficial, deep or posterior cervical adenopathy. Skin:     General: Skin is warm and dry. Neurological:      Mental Status: He is alert and oriented to person, place, and time. Cranial Nerves: No cranial nerve deficit. Coordination: Coordination normal.      Gait: Gait normal.   Psychiatric:         Thought Content: Thought content normal.         Procedure     Removal Impacted Cerumen    An operating microscope was utilized to visualize the external auditory canals using a 3mm speculum. Cerumen was removed with curettes and dalal suctions on both sides. The tympanic membrane is intact. No fluid visualized in the middle ear. No complications. Assessment and Plan     1. Bilateral impacted cerumen  -removed without complication    2. Viral upper respiratory tract infection  -discussed nasal rinse, decongestant          Gio López DO  12/22/22      Portions of this note were dictated using Dragon.  There may be linguistic errors secondary to the use of this program.

## 2022-12-22 NOTE — PATIENT INSTRUCTIONS
Chuck Johnson        NASAL SALINE RINSES      Sinus rinse kit samples may be available in our office or purchased from a pharmacy or drug store. If a kit is used, follow the directions in the kit. 1/4 tsp baking soda  1/4-1/2 tsp non-iodized salt, sea salt or kosher salt (baking supply isle)  8 oz. Distilled water (NO TAP WATER-however may use water boiled for 20 minutes and allowed to cool to body temperature before using). Using a sqeeze bottle (highly recommended), Neti Pot or baby bulb syringe, fill with room temperature salt-water solution. Stand at sink with head bent. Squeeze solution into nasal passage to wash out nasal secretions. This helps clean out the nose and sinuses. You may use the sinus rinse as many times as day as needed, you may also decrease the amount of baking soda and sea salt by half if there is too much burning. Clean your squeeze bottle, Neti Pot, or bulb syringe with vinegar, rinse and dry completely.     **If you have any questions please call the office at [phone]      Can take sudafed as needed for congestion and pressure

## 2022-12-25 ENCOUNTER — PATIENT MESSAGE (OUTPATIENT)
Dept: ENT CLINIC | Age: 47
End: 2022-12-25

## 2022-12-25 DIAGNOSIS — R05.8 OTHER COUGH: Primary | ICD-10-CM

## 2022-12-27 RX ORDER — CETIRIZINE HYDROCHLORIDE 10 MG/1
10 TABLET ORAL DAILY
Qty: 30 TABLET | Refills: 11 | Status: SHIPPED | OUTPATIENT
Start: 2022-12-27 | End: 2023-01-26

## 2022-12-27 NOTE — TELEPHONE ENCOUNTER
From: Baljit Aguillon  To: Dr. Crews Pontiff: 12/25/2022 5:55 PM EST  Subject: Seasonal Cough    Hello, about a year ago I had a cough that lasted a while and did not stem from a cold or flu. You prescribed a medication called cetirizine and it helped relief the cough. Could you send a refill/ prescription for this again please? My cough has returned and it is really irritating. Thanks!

## 2023-01-17 ENCOUNTER — OFFICE VISIT (OUTPATIENT)
Dept: ENT CLINIC | Age: 48
End: 2023-01-17
Payer: MEDICAID

## 2023-01-17 VITALS
HEIGHT: 72 IN | SYSTOLIC BLOOD PRESSURE: 137 MMHG | WEIGHT: 245 LBS | TEMPERATURE: 98.4 F | HEART RATE: 87 BPM | DIASTOLIC BLOOD PRESSURE: 90 MMHG | BODY MASS INDEX: 33.18 KG/M2 | RESPIRATION RATE: 16 BRPM

## 2023-01-17 DIAGNOSIS — J01.90 ACUTE NON-RECURRENT SINUSITIS, UNSPECIFIED LOCATION: Primary | ICD-10-CM

## 2023-01-17 DIAGNOSIS — H60.8X1 CHRONIC ECZEMATOUS OTITIS EXTERNA OF RIGHT EAR: ICD-10-CM

## 2023-01-17 DIAGNOSIS — J04.0 LARYNGITIS: ICD-10-CM

## 2023-01-17 DIAGNOSIS — H61.23 BILATERAL IMPACTED CERUMEN: ICD-10-CM

## 2023-01-17 PROCEDURE — 31575 DIAGNOSTIC LARYNGOSCOPY: CPT | Performed by: OTOLARYNGOLOGY

## 2023-01-17 PROCEDURE — 99214 OFFICE O/P EST MOD 30 MIN: CPT | Performed by: OTOLARYNGOLOGY

## 2023-01-17 PROCEDURE — G8482 FLU IMMUNIZE ORDER/ADMIN: HCPCS | Performed by: OTOLARYNGOLOGY

## 2023-01-17 PROCEDURE — 1036F TOBACCO NON-USER: CPT | Performed by: OTOLARYNGOLOGY

## 2023-01-17 PROCEDURE — G8427 DOCREV CUR MEDS BY ELIG CLIN: HCPCS | Performed by: OTOLARYNGOLOGY

## 2023-01-17 PROCEDURE — G8417 CALC BMI ABV UP PARAM F/U: HCPCS | Performed by: OTOLARYNGOLOGY

## 2023-01-17 RX ORDER — DOXYCYCLINE HYCLATE 100 MG
100 TABLET ORAL 2 TIMES DAILY
Qty: 28 TABLET | Refills: 0 | Status: SHIPPED | OUTPATIENT
Start: 2023-01-17 | End: 2023-01-31

## 2023-01-17 RX ORDER — PREDNISONE 20 MG/1
20 TABLET ORAL 2 TIMES DAILY
Qty: 10 TABLET | Refills: 0 | Status: SHIPPED | OUTPATIENT
Start: 2023-01-17 | End: 2023-01-22

## 2023-01-17 RX ORDER — MOMETASONE FUROATE 1 MG/G
CREAM TOPICAL
Qty: 1 EACH | Refills: 0 | Status: SHIPPED | OUTPATIENT
Start: 2023-01-17

## 2023-01-17 NOTE — PATIENT INSTRUCTIONS
Afrin (oxymetazoline) 2 sprays each side twice a day x 3 days    Take prednisone in the morning x 5 days  Take doxycycline twice a day x 14 days  Use elocon (mometasone) twice a day x 2 weeks, then once a day x 1 week then as needed for itching    Surgery: septoplasty (treats deviated nasal septum)                 Turbinate reduction (treats enlarged inferior turbinates)

## 2023-01-18 ASSESSMENT — ENCOUNTER SYMPTOMS
SHORTNESS OF BREATH: 0
APNEA: 0
TROUBLE SWALLOWING: 0
EYE ITCHING: 0
COUGH: 0
SINUS PRESSURE: 0
FACIAL SWELLING: 0
VOICE CHANGE: 1
SORE THROAT: 0

## 2023-01-18 NOTE — PROGRESS NOTES
Rika Palmer [address], [address], [address] [place] [address]  P: [phone]       Patient     Erendira Manuel  1975    ChiefComplaint     Chief Complaint   Patient presents with    Hoarse     Patient states that he is here today because he is hoarse. He states that this started yesterday. He states that he does not have any other symptoms. History of Present Illness     Ralf Caldera is a 50-year-old male here today for evaluation of 24 hours of loss of voice and several weeks of nasal congestion postnasal drainage. Denies sore throat, dysphagia. Voice has been consistently poor for 24 hours. Reports chronic nasal congestion with difficulty breathing through his nose despite routine use of Flonase for greater than 1 month. Ears feel full.     Past Medical History     Past Medical History:   Diagnosis Date    HLD (hyperlipidemia)     IFG (impaired fasting glucose)     PRACHI (obstructive sleep apnea)     nasal pillow cpap, Dr Ievtt Giron       Past Surgical History     Past Surgical History:   Procedure Laterality Date    CYST REMOVAL N/A 2016    3 cysts on scalp    HEMORRHOID SURGERY      HERNIA REPAIR      B/L inguinal       Family History     Family History   Problem Relation Age of Onset    Hypertension Mother     Other Father         Chronic kidney infx,        Social History     Social History     Tobacco Use    Smoking status: Former     Packs/day: 0.10     Types: Cigars, Cigarettes     Quit date: 3/1/2020     Years since quittin.8    Smokeless tobacco: Never    Tobacco comments:     states 1 cigar a week when fishing   Vaping Use    Vaping Use: Never used   Substance Use Topics    Alcohol use: No     Alcohol/week: 0.0 standard drinks    Drug use: No        Allergies     No Known Allergies    Medications     Current Outpatient Medications   Medication Sig Dispense Refill    mometasone (ELOCON) 0.1 % cream Apply topically twice a day x 1 week then once a day then as needed 1 each 0    predniSONE (DELTASONE) 20 MG tablet Take 1 tablet by mouth 2 times daily for 5 days 10 tablet 0    doxycycline hyclate (VIBRA-TABS) 100 MG tablet Take 1 tablet by mouth 2 times daily for 14 days 28 tablet 0    cetirizine (ZYRTEC) 10 MG tablet Take 1 tablet by mouth daily 30 tablet 11    Multiple Vitamin (THERA-TABS) TABS TAKE 1 TABLET BY MOUTH EVERY DAY 30 tablet 12    fenofibrate (TRICOR) 145 MG tablet Take 1 tablet by mouth in the morning. 30 tablet 12    tamsulosin (FLOMAX) 0.4 MG capsule TAKE 1 CAPSULE BY MOUTH EVERY DAY 30 capsule 12    Cholecalciferol (VITAMIN D3) 50 MCG (2000 UT) TABS TAKE 1 TABLET BY MOUTH EVERY DAY 30 tablet 12    ibuprofen (ADVIL;MOTRIN) 800 MG tablet TAKE 1 TABLET BY MOUTH EVERY 8 HOURS AS NEEDED FOR PAIN 90 tablet 3    fluticasone (FLONASE) 50 MCG/ACT nasal spray SPRAY 2 SPRAYS INTO EACH NOSTRIL EVERY DAY 1 Bottle 5    clobetasol (TEMOVATE) 0.05 % ointment APPLY TO AFFECTED AREA TWICE A DAY 15 g 1     No current facility-administered medications for this visit. Review of Systems     Review of Systems   Constitutional:  Negative for appetite change, chills, fatigue, fever and unexpected weight change. HENT:  Positive for congestion, postnasal drip and voice change. Negative for ear discharge, ear pain, facial swelling, hearing loss, nosebleeds, sinus pressure, sneezing, sore throat, tinnitus and trouble swallowing. Eyes:  Negative for itching. Respiratory:  Negative for apnea, cough and shortness of breath. Endocrine: Negative for cold intolerance and heat intolerance. Musculoskeletal:  Negative for myalgias and neck pain. Skin:  Negative for rash. Allergic/Immunologic: Negative for environmental allergies. Neurological:  Negative for dizziness and headaches. Psychiatric/Behavioral:  Negative for confusion, decreased concentration and sleep disturbance.         PhysicalExam     Vitals:    01/17/23 1503   BP: (!) 137/90   Site: Right Upper Arm   Position: Sitting   Cuff Size: Medium Adult   Pulse: 87   Resp: 16   Temp: 98.4 °F (36.9 °C)   TempSrc: Infrared   Weight: 245 lb (111.1 kg)   Height: 6' (1.829 m)       Physical Exam  Constitutional:       General: He is not in acute distress. Appearance: He is well-developed. HENT:      Head: Normocephalic and atraumatic. Comments: Voice rough     Right Ear: Ear canal and external ear normal. No drainage. There is impacted cerumen. Left Ear: Ear canal and external ear normal. No drainage. There is impacted cerumen. Nose: No mucosal edema or rhinorrhea. Mouth/Throat:      Lips: Pink. Mouth: Mucous membranes are moist.      Tongue: No lesions. Palate: No mass. Pharynx: Uvula midline. Eyes:      Pupils: Pupils are equal, round, and reactive to light. Neck:      Thyroid: No thyroid mass or thyromegaly. Trachea: Trachea and phonation normal.   Cardiovascular:      Pulses: Normal pulses. Pulmonary:      Effort: Pulmonary effort is normal. No accessory muscle usage or respiratory distress. Breath sounds: No stridor. Musculoskeletal:      Cervical back: Full passive range of motion without pain. Lymphadenopathy:      Head:      Right side of head: No submental or submandibular adenopathy. Left side of head: No submental or submandibular adenopathy. Cervical: No cervical adenopathy. Right cervical: No superficial, deep or posterior cervical adenopathy. Left cervical: No superficial, deep or posterior cervical adenopathy. Skin:     General: Skin is warm and dry. Neurological:      Mental Status: He is alert and oriented to person, place, and time. Cranial Nerves: No cranial nerve deficit. Coordination: Coordination normal.      Gait: Gait normal.   Psychiatric:         Thought Content:  Thought content normal.         Procedure     Flexible Laryngoscopy    Pre op: dysphonia  Post op: laryngitis, acute sinusitis  Procedure : Flexible Laryngoscopy  Surgeon: Kaci Lemos DO  Anesthesia: Afrin with 4% lidocaine  Indication: 54-year-old male with loss of voice x24 hours with significant postnasal drainage laryngeal mirror examination was not tolerated due to gag reflex  Description:  The scope was passed along the floor of the right naris to the level of the larynx. The base of tongue, vallecula, epiglottis, aryepiglottic folds, arytenoids, false vocal folds, true vocal folds, or pyriform sinuses were all evaluated. True vocal folds exhibited symmetric motion bilaterally without evidence of paralysis or paresis. The scope was removed. The patient tolerated the procedure without difficulty. There were no complications. Pertinent findings: Erythema and edema of right true vocal cord, bilateral nares inspected purulent drainage from left middle meatus      Assessment and Plan     1. Acute non-recurrent sinusitis, unspecified location  - doxycycline hyclate (VIBRA-TABS) 100 MG tablet; Take 1 tablet by mouth 2 times daily for 14 days  Dispense: 28 tablet; Refill: 0    2. Laryngitis  - predniSONE (DELTASONE) 20 MG tablet; Take 1 tablet by mouth 2 times daily for 5 days  Dispense: 10 tablet; Refill: 0    3. Chronic eczematous otitis externa of right ear    - mometasone (ELOCON) 0.1 % cream; Apply topically twice a day x 1 week then once a day then as needed  Dispense: 1 each; Refill: 0    4. Bilateral impacted cerumen  -Right without complication      Septoplasty bilateral submucosal turbinate reduction discussed in detail with patient. Understands surgery, risks including postoperative bleeding septal perforation. Wishes to consider his options he returns for his routine follow-up or call sooner if he wishes to proceed with surgery        Kaci Lemos DO  1/18/23      Portions of this note were dictated using Dragon.  There may be linguistic errors secondary to the use of this program.

## 2023-02-04 ENCOUNTER — HOSPITAL ENCOUNTER (EMERGENCY)
Age: 48
Discharge: HOME | End: 2023-02-04
Attending: STUDENT IN AN ORGANIZED HEALTH CARE EDUCATION/TRAINING PROGRAM
Payer: MEDICAID

## 2023-02-04 ENCOUNTER — APPOINTMENT (OUTPATIENT)
Dept: GENERAL RADIOLOGY | Age: 48
End: 2023-02-04
Payer: MEDICAID

## 2023-02-04 VITALS
BODY MASS INDEX: 34.32 KG/M2 | RESPIRATION RATE: 18 BRPM | OXYGEN SATURATION: 97 % | TEMPERATURE: 98.8 F | DIASTOLIC BLOOD PRESSURE: 91 MMHG | HEART RATE: 98 BPM | WEIGHT: 253.4 LBS | SYSTOLIC BLOOD PRESSURE: 117 MMHG | HEIGHT: 72 IN

## 2023-02-04 DIAGNOSIS — U07.1 COVID: Primary | ICD-10-CM

## 2023-02-04 LAB
INFLUENZA A: NOT DETECTED
INFLUENZA B: NOT DETECTED
SARS-COV-2 RNA, RT PCR: DETECTED

## 2023-02-04 PROCEDURE — 71045 X-RAY EXAM CHEST 1 VIEW: CPT

## 2023-02-04 PROCEDURE — 93005 ELECTROCARDIOGRAM TRACING: CPT | Performed by: STUDENT IN AN ORGANIZED HEALTH CARE EDUCATION/TRAINING PROGRAM

## 2023-02-04 PROCEDURE — 87636 SARSCOV2 & INF A&B AMP PRB: CPT

## 2023-02-04 PROCEDURE — 99285 EMERGENCY DEPT VISIT HI MDM: CPT

## 2023-02-04 RX ORDER — IBUPROFEN 800 MG/1
800 TABLET ORAL EVERY 8 HOURS PRN
Qty: 90 TABLET | Refills: 1 | Status: SHIPPED | OUTPATIENT
Start: 2023-02-04

## 2023-02-04 RX ORDER — IBUPROFEN 400 MG/1
400 TABLET ORAL ONCE
Status: DISCONTINUED | OUTPATIENT
Start: 2023-02-04 | End: 2023-02-04 | Stop reason: HOSPADM

## 2023-02-04 ASSESSMENT — PAIN - FUNCTIONAL ASSESSMENT: PAIN_FUNCTIONAL_ASSESSMENT: NONE - DENIES PAIN

## 2023-02-04 NOTE — DISCHARGE INSTRUCTIONS
Please stop taking tamsulosin (FLOMAX) while you are also taking paxlovid. You may resume tamsulosin after you have completed paxlovid.

## 2023-02-04 NOTE — ED PROVIDER NOTES
I independently performed a history and physical on Balta Hogan. All diagnostic, treatment, and disposition decisions were made by myself in conjunction with the advanced practice provider/resident. Labs Reviewed   COVID-19 & INFLUENZA COMBO - Abnormal; Notable for the following components:       Result Value    SARS-CoV-2 RNA, RT PCR DETECTED (*)     All other components within normal limits    Narrative:     No time/Initials     XR CHEST PORTABLE   Final Result   In a patient with fever, perihilar and reticular airspace changes may   represent an atypical pattern of pneumonia. Asymmetric pulmonary edema is   not excluded. The Ekg interpreted by me shows  normal sinus rhythm with a rate of 97  Axis is   Normal  QTc is  normal  Intervals and Durations are unremarkable. ST Segments: nonspecific changes, T wave inversion in lead III  Nonspecific T wave inversion in lead III is new, otherwise no significant change compared to previous performed 9/24/2019    For further details of [facility] emergency department encounter, please see the ANDREWS/resident's documentation. I personally saw the patient and performed a substantive portion of the visit including all aspects of the medical decision making. Briefly, this is a very pleasant 19-year-old male, presenting with 1 day history of fever, cough and sore throat. Patient states that symptoms started yesterday and at that time he was having a cough productive of mucus. She states the cough is a dry cough. He took some ibuprofen yesterday which seemed to help slightly. Has not taken any today but took 500 mg of Tylenol around noon. He states that this has not helped him much. He denies any difficulty speaking or swallowing. Denies abdominal pain. Denies dysuria or hematuria. He did denies any other complaints or concerns. Chest x-ray shows potential atypical pneumonia. Did test positive for COVID.   Oxygen saturation 97% on room air. Fever and heart rate decreased without treatment here in the department. Patient was offered Paxlovid which she is requesting a prescription for. He was consulted and recommended that he discontinue his Flomax while taking the Paxlovid. Given return precautions for the ED. Patient is comfortable in agreement with plan of care. I personally saw the patient and independently provided 0 minutes of non-concurrent critical care out of the total shared critical care time provided. I, Dr. Jodee Herrera, am the primary clinician of record. 1. COVID          Comment: Please note this report has been produced using speech recognition software and may contain errors related to that system including errors in grammar, punctuation, and spelling, as well as words and phrases that may be inappropriate. If there are any questions or concerns please feel free to contact the dictating provider for clarification.        Renetta Washington MD  02/04/23 [id number]

## 2023-02-04 NOTE — ED PROVIDER NOTES
[facility]  ED  [address]        Patient Name: Eladia Carcamo  MRN: [id number]  [place] 1975  Date of evaluation: 2/4/2023  Attending Provider: Leila Zazueta MD  Resident Provider: Joceline Johnson MD, PhD   PCP: Alirio Wilson MD  Note Started: 3:26 PM EST 2/4/23    CHIEF COMPLAINT       Fever (Pt to ED with c/o fever since last night. Pt reports cough and congestion. Fever of 104 today at home. + fatigue and body aches. + abd pain intermittent. Denies N/V. Took 500 mg tylenol around noon today.)      HISTORY OF PRESENT ILLNESS: 1 or more Elements     History from : Patient    Limitations to history : None    Eladia Carcamo is a 49 y.o. male who presents for acute fever    - fever started last night, Tmax 104.1 F   - ibuprofen helps, tylenol does not  - cough, nasal congestion, chest discomfort  - no SOB, abd pain, joint pain, rash, ear pain, dysuria, diarrhea    Nursing Notes were all reviewed and agreed with or any disagreements were addressed in the HPI. REVIEW OF SYSTEMS :      Positives and Pertinent negatives as per HPI. SURGICAL HISTORY     Past Surgical History:   Procedure Laterality Date    CYST REMOVAL N/A 1-    3 cysts on scalp    HEMORRHOID SURGERY  2001    HERNIA REPAIR  2008    B/L inguinal       CURRENTMEDICATIONS       Previous Medications    CHOLECALCIFEROL (VITAMIN D3) 50 MCG (2000 UT) TABS    TAKE 1 TABLET BY MOUTH EVERY DAY    CLOBETASOL (TEMOVATE) 0.05 % OINTMENT    APPLY TO AFFECTED AREA TWICE A DAY    FENOFIBRATE (TRICOR) 145 MG TABLET    Take 1 tablet by mouth in the morning.     FLUTICASONE (FLONASE) 50 MCG/ACT NASAL SPRAY    SPRAY 2 SPRAYS INTO EACH NOSTRIL EVERY DAY    IBUPROFEN (ADVIL;MOTRIN) 800 MG TABLET    TAKE 1 TABLET BY MOUTH EVERY 8 HOURS AS NEEDED FOR PAIN    MOMETASONE (ELOCON) 0.1 % CREAM    Apply topically twice a day x 1 week then once a day then as needed    MULTIPLE VITAMIN (THERA-TABS) TABS    TAKE 1 TABLET BY MOUTH EVERY DAY    TAMSULOSIN (FLOMAX) 0.4 MG CAPSULE    TAKE 1 CAPSULE BY MOUTH EVERY DAY       ALLERGIES     Patient has no known allergies. FAMILYHISTORY       Family History   Problem Relation Age of Onset    Hypertension Mother     Other Father         Chronic kidney infx,         SOCIAL HISTORY       Social History     Tobacco Use    Smoking status: Former     Packs/day: 0.10     Types: Cigars, Cigarettes     Quit date: 3/1/2020     Years since quittin.9    Smokeless tobacco: Never    Tobacco comments:     states 1 cigar a week when fishing   Vaping Use    Vaping Use: Never used   Substance Use Topics    Alcohol use: No     Alcohol/week: 0.0 standard drinks    Drug use: No       SCREENINGS                         CIWA Assessment  BP: (!) 117/91  Heart Rate: 98           PHYSICAL EXAM  1 or more Elements     ED Triage Vitals [23 1437]   BP Temp Temp Source Heart Rate Resp SpO2 Height Weight   (!) 147/82 (!) 101.3 °F (38.5 °C) Oral (!) 105 16 94 % 6' (1.829 m) 253 lb 6.4 oz (114.9 kg)       General: No acute distress. Alert and Oriented. Appears stated age. HEENT: No midline cervical spine tenderness. Full ROM of the neck. There is no significant cervical lympadenopathy. No difficulty tolerating oral secretions. Cardiac: Regular rate and rhythm. Radial pulses are intact bilaterally. Chest: No respiratory distress. Clear breath sounds bilaterally. No increased work of breathing. No use of accessory muscles for respiration. Abdomen: Soft, nontender, nondistended, non-peritonitic. Extremities:No significant lower extremity edema. Lower extremities are symmetric. Neuro: Moving all extremities. No focal deficits. Speech is clear. Skin:No rash, no erythema  Psych: Calm and cooperative.       DIAGNOSTIC RESULTS   LABS:    Labs Reviewed   COVID-19 & INFLUENZA COMBO - Abnormal; Notable for the following components:       Result Value    SARS-CoV-2 RNA, RT PCR DETECTED (*) All other components within normal limits    Narrative:     No time/Initials       When ordered only abnormal lab results are displayed. All other labs were within normal range or not returned as of this dictation. EKG  The EKG, as interpreted by myself, in the emergency department in the absence of a cardiologist.  normal sinus rhythm with a rate wnl  Axis is   Normal  QTc is  normal  Intervals and Durations are unremarkable. No specific ST-T wave changes appreciated. No evidence of acute ischemia. No significant change from prior EKG dated 09/24/2019      RADIOLOGY:   Non-plain film images such as CT, Ultrasound and MRI are read by the radiologist. Stephanie Dhaliwal radiographic images are visualized and preliminarily interpreted by the ED Provider with the below findings:    CXR: perihilar and reticular airspace changes in the context of URI sx may represent an atypical pattern of PNA but otherwise unremarkable finding    Interpretation per the Radiologist below, if available at the time of this note:    XR CHEST PORTABLE   Final Result   In a patient with fever, perihilar and reticular airspace changes may   represent an atypical pattern of pneumonia. Asymmetric pulmonary edema is   not excluded. No results found. Bedside Ultrasound, as interpreted by me, if performed:    No results found. PROCEDURES     Unless otherwise noted below, none     Procedures    CRITICAL CARE TIME     I personally spent a total of 00 minutes of critical care time in obtaining history, performing a physical exam, bedside monitoring of interventions, collecting and interpreting tests and discussion with consultants but excluding time spent performing procedures, treating other patients and teaching time.                                                                                                            PAST MEDICAL HISTORY      has a past medical history of HLD (hyperlipidemia), IFG (impaired fasting glucose), and PRACHI (obstructive sleep apnea) (2017). EMERGENCY DEPARTMENT COURSE and DIFFERENTIAL DIAGNOSIS/MDM:     Vitals:    Vitals:    02/04/23 1437 02/04/23 1634   BP: (!) 147/82 (!) 117/91   Pulse: (!) 105 98   Resp: 16 18   Temp: (!) 101.3 °F (38.5 °C) 98.8 °F (37.1 °C)   TempSrc: Oral Oral   SpO2: 94% 97%   Weight: 253 lb 6.4 oz (114.9 kg)    Height: 6' (1.829 m)        Patient was treated with and given the following medications:  Medications   ibuprofen (ADVIL;MOTRIN) tablet 400 mg (400 mg Oral Not Given 2/4/23 1540)             Is this patient to be included in the SEP-1 Core Measure due to severe sepsis or septic shock? No Exclusion criteria - the patient is NOT to be included for SEP-1 Core Measure due to: May have criteria for sepsis, but does not meet criteria for severe sepsis or septic shock    CC/HPI Summary, DDx, ED Course, and Reassessment:     Robert Lam is a 67-year-old male presenting with acute fever. The fever started last night, Tmax 104.1 F, took ibuprofen which helped and slept fine. Woke up with persistent fever. Took tylenol 500 mg today at noon but reports not as helpful. Cough, nasal congestion, chest discomfort. No SOB, abd pain, dysuria, diarrhea, joint pain or rashes. Daughter was also ill recently with similar symptoms but did not seek care. The patient reports of being vaccinated and boosted against covid and received this year's flu vaccine. The patient was seen non-toxic appearing, lungs were clear, normal S1 S2, benign abd exam, no suprapubic tenderness, no rashes. The oral exam was unremarkable - no exudate or cervical tenderness/LAD. Ear exam was limited by excessive cerumen but no purulence to erythema. The patient met the SIRS criteria (fever and HR) but given upper URI viral sx no sepsis workup was pursued. The patient was offered toradol once in ED but patient declined due to recently having had taken tylenol and not feeling the need for another med at that time.  Julia score was very low therefore rapid strep was not ordered. Covid/flu combo was ordered and showed covid positive and flu negative. CXR was ordered to r/o PNA which was read as \"in a patient with fever, perihilar and reticular airspace changes may represent an atypical pattern of pneumonia. \" On my read, I did not appreciate any consolidation to indicate obvious PNA. ECG was ordered give pt's chest discomfort which did not show any concerning changes. Taken altogether, the patient likely has URI 2/2 covid. Pharmacy recommended holding tamsulosin while on paxlovid therefore advised the patient that if he decides to take paxlovid that he should hold his tamsulosin and resume after the completion of paxlovid. The patient was discharged to home on stable condition with paxlovid prescription. CONSULTS: (Who and What was discussed)  None    Discussion with Other Professionals : None    Social Determinants : None    Patient's care impacted by chronic condition(s): none    Records Reviewed : None    I considered blood culture but did not after shared decision making with patient due to unlikely sepsis    Appropriate for outpatient management PCP f/u    I am the Primary Clinician of Record. FINAL IMPRESSION      1. COVID          DISPOSITION/PLAN     DISPOSITION Decision To Discharge 02/04/2023 04:24:16 PM      PATIENT REFERRED TO:  MD Ginette Blanco[address] [address]  [phone]    Schedule an appointment as soon as possible for a visit       [facility]  ED  [facility] [address] [phone]    If symptoms worsen    DISCHARGE MEDICATIONS:  Patient was given scripts for the following medications. I counseled patient how to take these medications:  New Prescriptions    NIRMATRELVIR/RITONAVIR 300/100 (PAXLOVID) 20 X 150 MG & 10 X 100MG TBPK    Take 3 tablets (two 150 mg nirmatrelvir and one 100 mg ritonavir tablets) by mouth every 12 hours for 5 days. DISCONTINUED MEDICATIONS:  Discontinued Medications    No medications on file              (This chart was generated in part by using Dragon Dictation system and may contain errors related to that system including errors in grammar, punctuation, and spelling, as well as words and phrases that may be inappropriate.  If there are any questions or concerns please feel free to contact the dictating provider for clarification.)    Harini Castano MD, PhD  Family Medicine, PGY-1       Harini Castano MD PhD  Resident  02/04/23 [address] [place] Kiesha Byers MD  02/04/23 6822

## 2023-02-05 ENCOUNTER — PATIENT MESSAGE (OUTPATIENT)
Dept: FAMILY MEDICINE CLINIC | Age: 48
End: 2023-02-05

## 2023-02-05 LAB
EKG ATRIAL RATE: 97 BPM
EKG DIAGNOSIS: NORMAL
EKG P AXIS: 36 DEGREES
EKG P-R INTERVAL: 142 MS
EKG Q-T INTERVAL: 316 MS
EKG QRS DURATION: 84 MS
EKG QTC CALCULATION (BAZETT): 401 MS
EKG R AXIS: 19 DEGREES
EKG T AXIS: 15 DEGREES
EKG VENTRICULAR RATE: 97 BPM

## 2023-02-05 PROCEDURE — 93010 ELECTROCARDIOGRAM REPORT: CPT | Performed by: INTERNAL MEDICINE

## 2023-02-06 ENCOUNTER — TELEPHONE (OUTPATIENT)
Dept: ENT CLINIC | Age: 48
End: 2023-02-06

## 2023-02-06 NOTE — TELEPHONE ENCOUNTER
Patient has been advised to reschedule appointment, also requested that patient contact PCP for supportive care-    \"Lavon had a cough for the past 3  weeks that has been bothering me. The pills you gave me, the cetirizine, has not helped. Yesterday I got diagnosed with Covid and the cough has gotten a lot worse and has became drier with abdominal pain. I have an appointment on the 7th should I cancel since I have Covid? \"

## 2023-02-06 NOTE — TELEPHONE ENCOUNTER
From: Roberto Hassan  To: Dr. East Bars: 2/5/2023 5:37 PM EST  Subject: Covid     Hello doctor, on Saturday I was diagnosed with COVID after going there for experiencing severe symptoms. Ive had a constant lower stomach pain that will not go away along with sever diarrhea. Is there any further testing needed? Its concerning us. thank you

## 2023-05-02 RX ORDER — TAMSULOSIN HYDROCHLORIDE 0.4 MG/1
CAPSULE ORAL
Qty: 30 CAPSULE | Refills: 12 | Status: SHIPPED | OUTPATIENT
Start: 2023-05-02

## 2023-05-08 RX ORDER — CHOLECALCIFEROL (VITAMIN D3) 125 MCG
CAPSULE ORAL
Qty: 30 TABLET | Refills: 12 | Status: SHIPPED | OUTPATIENT
Start: 2023-05-08

## 2023-05-09 ENCOUNTER — OFFICE VISIT (OUTPATIENT)
Dept: FAMILY MEDICINE CLINIC | Age: 48
End: 2023-05-09

## 2023-05-09 VITALS
HEART RATE: 89 BPM | DIASTOLIC BLOOD PRESSURE: 78 MMHG | BODY MASS INDEX: 34.45 KG/M2 | OXYGEN SATURATION: 97 % | SYSTOLIC BLOOD PRESSURE: 112 MMHG | WEIGHT: 254 LBS

## 2023-05-09 DIAGNOSIS — J02.9 SORE THROAT: Primary | ICD-10-CM

## 2023-05-09 DIAGNOSIS — H61.23 BILATERAL IMPACTED CERUMEN: ICD-10-CM

## 2023-05-09 LAB — S PYO AG THROAT QL: NORMAL

## 2023-05-09 RX ORDER — AZITHROMYCIN 250 MG/1
TABLET, FILM COATED ORAL
Qty: 1 PACKET | Refills: 0 | Status: SHIPPED | OUTPATIENT
Start: 2023-05-09

## 2023-05-09 ASSESSMENT — ENCOUNTER SYMPTOMS
NAUSEA: 0
SORE THROAT: 1
COUGH: 0

## 2023-05-09 ASSESSMENT — PATIENT HEALTH QUESTIONNAIRE - PHQ9
SUM OF ALL RESPONSES TO PHQ9 QUESTIONS 1 & 2: 0
1. LITTLE INTEREST OR PLEASURE IN DOING THINGS: 0
2. FEELING DOWN, DEPRESSED OR HOPELESS: 0
SUM OF ALL RESPONSES TO PHQ QUESTIONS 1-9: 0

## 2023-05-18 ENCOUNTER — OFFICE VISIT (OUTPATIENT)
Dept: ENT CLINIC | Age: 48
End: 2023-05-18
Payer: MEDICAID

## 2023-05-18 VITALS — BODY MASS INDEX: 34.4 KG/M2 | WEIGHT: 254 LBS | TEMPERATURE: 97.9 F | RESPIRATION RATE: 16 BRPM | HEIGHT: 72 IN

## 2023-05-18 DIAGNOSIS — H60.313 ACUTE DIFFUSE OTITIS EXTERNA OF BOTH EARS: Primary | ICD-10-CM

## 2023-05-18 DIAGNOSIS — H60.541 ECZEMA OF RIGHT EXTERNAL EAR: ICD-10-CM

## 2023-05-18 PROCEDURE — 1036F TOBACCO NON-USER: CPT | Performed by: OTOLARYNGOLOGY

## 2023-05-18 PROCEDURE — 99213 OFFICE O/P EST LOW 20 MIN: CPT | Performed by: OTOLARYNGOLOGY

## 2023-05-18 PROCEDURE — 4130F TOPICAL PREP RX AOE: CPT | Performed by: OTOLARYNGOLOGY

## 2023-05-18 PROCEDURE — G8417 CALC BMI ABV UP PARAM F/U: HCPCS | Performed by: OTOLARYNGOLOGY

## 2023-05-18 PROCEDURE — G8427 DOCREV CUR MEDS BY ELIG CLIN: HCPCS | Performed by: OTOLARYNGOLOGY

## 2023-05-18 RX ORDER — CIPROFLOXACIN HYDROCHLORIDE 3.5 MG/ML
4 SOLUTION/ DROPS TOPICAL 2 TIMES DAILY
Qty: 5 ML | Refills: 1 | Status: SHIPPED | OUTPATIENT
Start: 2023-05-18 | End: 2023-05-23

## 2023-05-18 ASSESSMENT — ENCOUNTER SYMPTOMS
ABDOMINAL PAIN: 0
SHORTNESS OF BREATH: 0
WHEEZING: 0

## 2023-05-18 NOTE — PATIENT INSTRUCTIONS
-Use ear drops as prescribed - keep ear upright for 10-15 minutes after placing them   -Dry ear precautions recommended as follows:   Patient to place either:  A silicone ear plug  A cotton ball coated in Vaseline   into both ears during showering, instructed to remove afterwards to aerate ears     Patient instructed to avoid digital trauma to the ears   Instructed to notify the clinic immediately with any acute otalgia, hearing loss, purulent otorrhea

## 2023-05-18 NOTE — PROGRESS NOTES
[address], [address], [address] [place] [address]  P: [phone]       Patient     Hari [place]  1975    Chief Concern     Chief Complaint   Patient presents with    Ear Problem     Both ears hurt but the left is worse right ear is watery       Assessment and Plan      Diagnosis Orders   1. Acute diffuse otitis externa of both ears  dexamethasone (DECADRON) 0.1 % ophthalmic suspension    ciprofloxacin (CILOXAN) 0.3 % ophthalmic solution      2. Eczema of right external ear          Mr. Toyin Ramos is a 69-year-old male with concern for left ear pain and fullness, right ear drainage following irrigation of the ear canals 9 days ago for cerumen impaction. On examination including binocular otomicroscopy he has wet cerumen in the right ear canal, left ear canal with significant edema/stenosis and inflammation-no granulation tissue or bony exposure, no facial nerve paresis to suggest skull base osteomyelitis. We will start him on eardrops, dry ear precautions and see him back in 2 weeks. Return in about 2 weeks (around 6/1/2023). History of Present Illness     Seen by PCP with irrigation of the bilateral ears for cerumen 5/9/2023, since that time has significant pruritus and otorrhea, and pain of the left ear with palpation fullness in the evenings. No tinnitus, vertigo. Of note, separate from this episode he states significant dryness and pruritus of the right ear canal.  History of struct of sleep apnea, cyst on scalp removed 01/2016. No history of ear surgery or ear tubes. Smokes 2-3 cigars daily.     Past Medical History     Past Medical History:   Diagnosis Date    HLD (hyperlipidemia)     IFG (impaired fasting glucose)     PRACHI (obstructive sleep apnea) 2017    nasal pillow cpap, [facility]       Past Surgical History     Past Surgical History:   Procedure Laterality Date    CYST REMOVAL N/A 1-    3 cysts on scalp    HEMORRHOID SURGERY  2001    HERNIA Patient is a 89y old  Female who presents with a chief complaint of confused (11 Jul 2020 13:45)      SUBJECTIVE / OVERNIGHT EVENTS:  No chest pain. No shortness of breath. No complaints. No events overnight.     MEDICATIONS  (STANDING):  heparin   Injectable 5000 Unit(s) SubCutaneous every 8 hours    MEDICATIONS  (PRN):      Vital Signs Last 24 Hrs  T(C): 36.3 (12 Jul 2020 04:53), Max: 36.6 (11 Jul 2020 12:47)  T(F): 97.4 (12 Jul 2020 04:53), Max: 97.8 (11 Jul 2020 12:47)  HR: 54 (12 Jul 2020 04:53) (53 - 68)  BP: 126/62 (12 Jul 2020 04:53) (113/61 - 126/62)  BP(mean): --  RR: 18 (12 Jul 2020 04:53) (18 - 18)  SpO2: 97% (12 Jul 2020 04:53) (96% - 97%)  CAPILLARY BLOOD GLUCOSE        I&O's Summary    11 Jul 2020 07:01  -  12 Jul 2020 07:00  --------------------------------------------------------  IN: 870 mL / OUT: 0 mL / NET: 870 mL        PHYSICAL EXAM:  GENERAL: NAD, well-developed  HEAD:  Atraumatic, Normocephalic  EYES: EOMI, PERRLA, conjunctiva and sclera clear  NECK: Supple, No JVD  CHEST/LUNG: Clear to auscultation bilaterally; No wheeze  HEART: Regular rate and rhythm; No murmurs, rubs, or gallops  ABDOMEN: Soft, Nontender, Nondistended; Bowel sounds present  EXTREMITIES:  2+ Peripheral Pulses, No clubbing, cyanosis, or edema  PSYCH: AAOx1  NEUROLOGY: non-focal  SKIN: No rashes or lesions    LABS:                    RADIOLOGY & ADDITIONAL TESTS:    Imaging Personally Reviewed:    Consultant(s) Notes Reviewed:      Care Discussed with Consultants/Other Providers:

## 2023-05-25 ENCOUNTER — COMMUNITY OUTREACH (OUTPATIENT)
Dept: FAMILY MEDICINE CLINIC | Age: 48
End: 2023-05-25

## 2023-05-25 NOTE — PROGRESS NOTES
Patient's HM shows they are overdue for Colorectal Screening. Care Everywhere and  files searched.  updated with 2017 colonoscopy. Cologuard ordered, not yet completed.

## 2023-08-02 RX ORDER — FENOFIBRATE 145 MG/1
TABLET, COATED ORAL
Qty: 30 TABLET | Refills: 12 | Status: SHIPPED | OUTPATIENT
Start: 2023-08-02

## 2023-09-23 ENCOUNTER — APPOINTMENT (OUTPATIENT)
Dept: GENERAL RADIOLOGY | Age: 48
End: 2023-09-23
Payer: MEDICAID

## 2023-09-23 ENCOUNTER — HOSPITAL ENCOUNTER (EMERGENCY)
Age: 48
Discharge: HOME | End: 2023-09-23
Attending: STUDENT IN AN ORGANIZED HEALTH CARE EDUCATION/TRAINING PROGRAM
Payer: MEDICAID

## 2023-09-23 VITALS
BODY MASS INDEX: 33.86 KG/M2 | SYSTOLIC BLOOD PRESSURE: 141 MMHG | HEIGHT: 72 IN | OXYGEN SATURATION: 94 % | RESPIRATION RATE: 16 BRPM | HEART RATE: 75 BPM | DIASTOLIC BLOOD PRESSURE: 94 MMHG | TEMPERATURE: 97.9 F | WEIGHT: 250 LBS

## 2023-09-23 DIAGNOSIS — M79.675 GREAT TOE PAIN, LEFT: ICD-10-CM

## 2023-09-23 DIAGNOSIS — S93.401A SPRAIN OF RIGHT ANKLE, UNSPECIFIED LIGAMENT, INITIAL ENCOUNTER: Primary | ICD-10-CM

## 2023-09-23 DIAGNOSIS — R73.9 HYPERGLYCEMIA: ICD-10-CM

## 2023-09-23 DIAGNOSIS — D64.9 NORMOCYTIC ANEMIA: ICD-10-CM

## 2023-09-23 LAB
ANION GAP SERPL CALCULATED.3IONS-SCNC: 11 MMOL/L (ref 3–16)
BASOPHILS # BLD: 0.1 K/UL (ref 0–0.2)
BASOPHILS NFR BLD: 1.1 %
BUN SERPL-MCNC: 14 MG/DL (ref 7–20)
CALCIUM SERPL-MCNC: 9.5 MG/DL (ref 8.3–10.6)
CHLORIDE SERPL-SCNC: 102 MMOL/L (ref 99–110)
CO2 SERPL-SCNC: 25 MMOL/L (ref 21–32)
CREAT SERPL-MCNC: 0.9 MG/DL (ref 0.9–1.3)
DEPRECATED RDW RBC AUTO: 12.8 % (ref 12.4–15.4)
EOSINOPHIL # BLD: 0.3 K/UL (ref 0–0.6)
EOSINOPHIL NFR BLD: 4.1 %
GFR SERPLBLD CREATININE-BSD FMLA CKD-EPI: >60 ML/MIN/{1.73_M2}
GLUCOSE SERPL-MCNC: 166 MG/DL (ref 70–99)
HCT VFR BLD AUTO: 37.2 % (ref 40.5–52.5)
HGB BLD-MCNC: 13.2 G/DL (ref 13.5–17.5)
LYMPHOCYTES # BLD: 1.9 K/UL (ref 1–5.1)
LYMPHOCYTES NFR BLD: 29.8 %
MCH RBC QN AUTO: 30.1 PG (ref 26–34)
MCHC RBC AUTO-ENTMCNC: 35.5 G/DL (ref 31–36)
MCV RBC AUTO: 85 FL (ref 80–100)
MONOCYTES # BLD: 0.3 K/UL (ref 0–1.3)
MONOCYTES NFR BLD: 5 %
NEUTROPHILS # BLD: 3.9 K/UL (ref 1.7–7.7)
NEUTROPHILS NFR BLD: 60 %
PLATELET # BLD AUTO: 221 K/UL (ref 135–450)
PMV BLD AUTO: 8.6 FL (ref 5–10.5)
POTASSIUM SERPL-SCNC: 3.8 MMOL/L (ref 3.5–5.1)
RBC # BLD AUTO: 4.38 M/UL (ref 4.2–5.9)
SODIUM SERPL-SCNC: 138 MMOL/L (ref 136–145)
URATE SERPL-MCNC: 5.2 MG/DL (ref 3.5–7.2)
WBC # BLD AUTO: 6.5 K/UL (ref 4–11)

## 2023-09-23 PROCEDURE — 84550 ASSAY OF BLOOD/URIC ACID: CPT

## 2023-09-23 PROCEDURE — 85025 COMPLETE CBC W/AUTO DIFF WBC: CPT

## 2023-09-23 PROCEDURE — 73630 X-RAY EXAM OF FOOT: CPT

## 2023-09-23 PROCEDURE — 73610 X-RAY EXAM OF ANKLE: CPT

## 2023-09-23 PROCEDURE — 80048 BASIC METABOLIC PNL TOTAL CA: CPT

## 2023-09-23 PROCEDURE — 99284 EMERGENCY DEPT VISIT MOD MDM: CPT

## 2023-09-23 RX ORDER — PREDNISONE 10 MG/1
TABLET ORAL
Qty: 20 TABLET | Refills: 0 | Status: SHIPPED | OUTPATIENT
Start: 2023-09-23 | End: 2023-10-03

## 2023-09-23 RX ORDER — NAPROXEN 500 MG/1
500 TABLET ORAL 2 TIMES DAILY WITH MEALS
Qty: 60 TABLET | Refills: 0 | Status: SHIPPED | OUTPATIENT
Start: 2023-09-23

## 2023-09-23 ASSESSMENT — PAIN SCALES - GENERAL: PAINLEVEL_OUTOF10: 7

## 2023-09-23 ASSESSMENT — PAIN DESCRIPTION - ORIENTATION: ORIENTATION: RIGHT

## 2023-09-23 ASSESSMENT — PAIN DESCRIPTION - LOCATION: LOCATION: ANKLE

## 2023-09-23 ASSESSMENT — PAIN - FUNCTIONAL ASSESSMENT: PAIN_FUNCTIONAL_ASSESSMENT: 0-10

## 2023-09-23 NOTE — ED PROVIDER NOTES
Emergency Department Provider Note  Location: [facility]  ED  9/23/2023     Patient Identification  Kathleen Andrade is a 49 y.o. male    Chief Complaint  Ankle Pain (Patient states he's had right ankle pain for approximately a week. Walks a lot, takes care of his special needs needs son and thinks he twisted it.)      Mode of Arrival  private car    HPI  (History provided by patient)  This is a 49 y.o. male with a PMH significant for HLD, PRACHI  presented today for right ankle pain and swelling. Symptoms started on Saturday. Patient reports that he is quite active and walks a lot. He currently takes care of his son and thinks he may have twisted it. He is endorsing swelling. He is still able to bear weight although painful. He denies any overlying redness, warmth or fever. He also endorses left great toe pain that is only present when he washes his feet. He has no pain with ambulation. Denies any injury. He denies any history of gout or septic joint. He denies any chest pain or shortness of breath. Denies any calf pain or swelling. He is not anticoagulated    ROS  Review of Systems   All other systems reviewed and are negative. I have reviewed the following nursing documentation:  Allergies: No Known Allergies    Past medical history:  has a past medical history of HLD (hyperlipidemia), IFG (impaired fasting glucose), and PRACHI (obstructive sleep apnea) (2017). Past surgical history:  has a past surgical history that includes hernia repair (2008); Hemorrhoid surgery (2001); and cyst removal (N/A, 1-). Home medications:   Prior to Admission medications    Medication Sig Start Date End Date Taking?  Authorizing Provider   naproxen (NAPROSYN) 500 MG tablet Take 1 tablet by mouth 2 times daily (with meals) 9/23/23  Yes Vika Obrien MD   predniSONE (DELTASONE) 10 MG tablet Take 4 tablets by mouth once daily for 5 days 9/23/23 10/3/23 Yes Vika Obrien MD   fenofibrate

## 2023-09-24 NOTE — ED NOTES
Written and verbal discharge provided to patient, patient verbalizes understanding. IV removed, no complications, dressing applied. Patient ambulatory with steady gait, GCS 15, no acute signs or symptoms of distress. Patient discharged at this time.      Stanley Urias RN  09/23/23 2006

## 2023-10-09 ENCOUNTER — OFFICE VISIT (OUTPATIENT)
Dept: FAMILY MEDICINE CLINIC | Age: 48
End: 2023-10-09
Payer: MEDICAID

## 2023-10-09 VITALS
DIASTOLIC BLOOD PRESSURE: 70 MMHG | OXYGEN SATURATION: 96 % | SYSTOLIC BLOOD PRESSURE: 110 MMHG | HEART RATE: 104 BPM | WEIGHT: 249 LBS | BODY MASS INDEX: 33.72 KG/M2 | HEIGHT: 72 IN

## 2023-10-09 DIAGNOSIS — R33.8 BENIGN PROSTATIC HYPERPLASIA WITH URINARY RETENTION: ICD-10-CM

## 2023-10-09 DIAGNOSIS — Z00.00 ANNUAL PHYSICAL EXAM: Primary | ICD-10-CM

## 2023-10-09 DIAGNOSIS — E78.2 MIXED HYPERLIPIDEMIA: ICD-10-CM

## 2023-10-09 DIAGNOSIS — Z23 NEEDS FLU SHOT: ICD-10-CM

## 2023-10-09 DIAGNOSIS — D64.9 LOW HEMOGLOBIN: ICD-10-CM

## 2023-10-09 DIAGNOSIS — R73.9 HYPERGLYCEMIA: ICD-10-CM

## 2023-10-09 DIAGNOSIS — M79.675 PAIN OF TOE OF LEFT FOOT: ICD-10-CM

## 2023-10-09 DIAGNOSIS — R30.0 DYSURIA: ICD-10-CM

## 2023-10-09 DIAGNOSIS — N40.1 BENIGN PROSTATIC HYPERPLASIA WITH URINARY RETENTION: ICD-10-CM

## 2023-10-09 PROCEDURE — 90471 IMMUNIZATION ADMIN: CPT | Performed by: FAMILY MEDICINE

## 2023-10-09 PROCEDURE — 99386 PREV VISIT NEW AGE 40-64: CPT | Performed by: FAMILY MEDICINE

## 2023-10-09 PROCEDURE — G8482 FLU IMMUNIZE ORDER/ADMIN: HCPCS | Performed by: FAMILY MEDICINE

## 2023-10-09 PROCEDURE — 90674 CCIIV4 VAC NO PRSV 0.5 ML IM: CPT | Performed by: FAMILY MEDICINE

## 2023-10-09 NOTE — PROGRESS NOTES
Assessment/Plan:    Nain Bolivar was seen today for annual exam.    Diagnoses and all orders for this visit:    Annual physical exam  -     Lipid Panel; Future  -     Comprehensive Metabolic Panel; Future   Routine exercise applauded    Needs flu shot  -     Influenza, FLUCELVAX, (age 10 mo+), IM, Preservative Free, 0.5 mL    Mixed hyperlipidemia  -     Lipid Panel; Future  -     Comprehensive Metabolic Panel; Future    Hyperglycemia  -     Hemoglobin A1C; Future    Dysuria  -     Urinalysis with Microscopic; Future  -     Culture, Urine; Future    Low hemoglobin (as per ED labs)  -     CBC with Auto Differential; Future  -     Ferritin; Future  -     Iron and TIBC; Future  -     Vitamin B12 & Folate; Future    Benign prostatic hyperplasia with urinary retention   Improved with d/c of flomax (unclear why)     Pain of toe of left foot   Exam benign. Perhaps pt has had subclinical paronychia that is resolving. There are no Patient Instructions on file for this visit. Patient: Mikhail Jain is a 49 y.o.male who presents today with the following Chief Complaint(s):  Chief Complaint   Patient presents with    Annual Exam         HPI: Pt with HLD, PRACHI on cpap ([email or web address]), hand dermatitis, hx of ear wax impaction (Dr Marion Duron), bph, anal fissue/hemorrhoids had R ankle sprain 2 wk ago when he twisted ankle. Xray neg. Was rx'd pred x 5 d and then naproxen. Swelling and pain are gradually decreasing. Glc 166 during recent ED visit. No sx/sx of high/low bs. For 4 mo, has TTP at prox L great toenail when washing it or when touching feet in prep for prayer. Shoes not painful. Has been on flomax x 3 yr but in past 2 mo felt that despite rx, he was not emptying bladder and has occ burn with voiding. Pt stopped flomax 3 wk ago, feels better w/o it. Has increased water intake, no longer drinks soda. Has been going to gym for wt lifting 3-4 x per wk. Swims once weekly with kids.      Smokes cigars when goes fishing

## 2023-10-14 ENCOUNTER — HOSPITAL ENCOUNTER (OUTPATIENT)
Age: 48
Discharge: HOME | End: 2023-10-14
Payer: MEDICAID

## 2023-10-14 DIAGNOSIS — D64.9 LOW HEMOGLOBIN: ICD-10-CM

## 2023-10-14 DIAGNOSIS — E78.2 MIXED HYPERLIPIDEMIA: ICD-10-CM

## 2023-10-14 DIAGNOSIS — R30.0 DYSURIA: ICD-10-CM

## 2023-10-14 DIAGNOSIS — R73.9 HYPERGLYCEMIA: ICD-10-CM

## 2023-10-14 DIAGNOSIS — Z00.00 ANNUAL PHYSICAL EXAM: ICD-10-CM

## 2023-10-14 LAB
ALBUMIN SERPL-MCNC: 4.5 G/DL (ref 3.4–5)
ALBUMIN/GLOB SERPL: 1.6 {RATIO} (ref 1.1–2.2)
ALP SERPL-CCNC: 101 U/L (ref 40–129)
ALT SERPL-CCNC: 23 U/L (ref 10–40)
ANION GAP SERPL CALCULATED.3IONS-SCNC: 8 MMOL/L (ref 3–16)
AST SERPL-CCNC: 22 U/L (ref 15–37)
BASOPHILS # BLD: 0.1 K/UL (ref 0–0.2)
BASOPHILS NFR BLD: 1 %
BILIRUB SERPL-MCNC: 0.3 MG/DL (ref 0–1)
BILIRUB UR QL STRIP.AUTO: NEGATIVE
BUN SERPL-MCNC: 10 MG/DL (ref 7–20)
CALCIUM SERPL-MCNC: 9.2 MG/DL (ref 8.3–10.6)
CHLORIDE SERPL-SCNC: 103 MMOL/L (ref 99–110)
CHOLEST SERPL-MCNC: 168 MG/DL (ref 0–199)
CLARITY UR: CLEAR
CO2 SERPL-SCNC: 28 MMOL/L (ref 21–32)
COLOR UR: YELLOW
CREAT SERPL-MCNC: 0.9 MG/DL (ref 0.9–1.3)
DEPRECATED RDW RBC AUTO: 13 % (ref 12.4–15.4)
EOSINOPHIL # BLD: 0.2 K/UL (ref 0–0.6)
EOSINOPHIL NFR BLD: 2.2 %
FERRITIN SERPL IA-MCNC: 203.9 NG/ML (ref 30–400)
FOLATE SERPL-MCNC: >20 NG/ML (ref 4.78–24.2)
GFR SERPLBLD CREATININE-BSD FMLA CKD-EPI: >60 ML/MIN/{1.73_M2}
GLUCOSE SERPL-MCNC: 95 MG/DL (ref 70–99)
GLUCOSE UR STRIP.AUTO-MCNC: NEGATIVE MG/DL
HCT VFR BLD AUTO: 41.4 % (ref 40.5–52.5)
HDLC SERPL-MCNC: 26 MG/DL (ref 40–60)
HGB BLD-MCNC: 14.3 G/DL (ref 13.5–17.5)
HGB UR QL STRIP.AUTO: NEGATIVE
IRON SATN MFR SERPL: 16 % (ref 20–50)
IRON SERPL-MCNC: 52 UG/DL (ref 59–158)
KETONES UR STRIP.AUTO-MCNC: NEGATIVE MG/DL
LDLC SERPL CALC-MCNC: 92 MG/DL
LEUKOCYTE ESTERASE UR QL STRIP.AUTO: NEGATIVE
LYMPHOCYTES # BLD: 1.7 K/UL (ref 1–5.1)
LYMPHOCYTES NFR BLD: 23.7 %
MCH RBC QN AUTO: 29.2 PG (ref 26–34)
MCHC RBC AUTO-ENTMCNC: 34.5 G/DL (ref 31–36)
MCV RBC AUTO: 84.7 FL (ref 80–100)
MONOCYTES # BLD: 0.5 K/UL (ref 0–1.3)
MONOCYTES NFR BLD: 7 %
NEUTROPHILS # BLD: 4.8 K/UL (ref 1.7–7.7)
NEUTROPHILS NFR BLD: 66.1 %
NITRITE UR QL STRIP.AUTO: NEGATIVE
PH UR STRIP.AUTO: 7 [PH] (ref 5–8)
PLATELET # BLD AUTO: 224 K/UL (ref 135–450)
PMV BLD AUTO: 9.3 FL (ref 5–10.5)
POTASSIUM SERPL-SCNC: 4.8 MMOL/L (ref 3.5–5.1)
PROT SERPL-MCNC: 7.3 G/DL (ref 6.4–8.2)
PROT UR STRIP.AUTO-MCNC: NEGATIVE MG/DL
RBC # BLD AUTO: 4.89 M/UL (ref 4.2–5.9)
RBC #/AREA URNS HPF: NORMAL /HPF (ref 0–4)
SODIUM SERPL-SCNC: 139 MMOL/L (ref 136–145)
SP GR UR STRIP.AUTO: 1.01 (ref 1–1.03)
TIBC SERPL-MCNC: 334 UG/DL (ref 260–445)
TRIGL SERPL-MCNC: 251 MG/DL (ref 0–150)
UA DIPSTICK W REFLEX MICRO PNL UR: NORMAL
URN SPEC COLLECT METH UR: NORMAL
UROBILINOGEN UR STRIP-ACNC: 0.2 E.U./DL
VIT B12 SERPL-MCNC: 391 PG/ML (ref 211–911)
VLDLC SERPL CALC-MCNC: 50 MG/DL
WBC # BLD AUTO: 7.3 K/UL (ref 4–11)
WBC #/AREA URNS HPF: NORMAL /HPF (ref 0–5)

## 2023-10-14 PROCEDURE — 36415 COLL VENOUS BLD VENIPUNCTURE: CPT

## 2023-10-14 PROCEDURE — 83550 IRON BINDING TEST: CPT

## 2023-10-14 PROCEDURE — 83540 ASSAY OF IRON: CPT

## 2023-10-14 PROCEDURE — 82728 ASSAY OF FERRITIN: CPT

## 2023-10-14 PROCEDURE — 82607 VITAMIN B-12: CPT

## 2023-10-14 PROCEDURE — 83036 HEMOGLOBIN GLYCOSYLATED A1C: CPT

## 2023-10-14 PROCEDURE — 85025 COMPLETE CBC W/AUTO DIFF WBC: CPT

## 2023-10-14 PROCEDURE — 82746 ASSAY OF FOLIC ACID SERUM: CPT

## 2023-10-14 PROCEDURE — 87086 URINE CULTURE/COLONY COUNT: CPT

## 2023-10-14 PROCEDURE — 80053 COMPREHEN METABOLIC PANEL: CPT

## 2023-10-14 PROCEDURE — 80061 LIPID PANEL: CPT

## 2023-10-14 PROCEDURE — 81001 URINALYSIS AUTO W/SCOPE: CPT

## 2023-10-15 LAB
BACTERIA UR CULT: NORMAL
EST. AVERAGE GLUCOSE BLD GHB EST-MCNC: 105.4 MG/DL
HBA1C MFR BLD: 5.3 %

## 2023-10-17 RX ORDER — ROSUVASTATIN CALCIUM 5 MG/1
5 TABLET, COATED ORAL NIGHTLY
Qty: 30 TABLET | Refills: 12 | Status: SHIPPED | OUTPATIENT
Start: 2023-10-17

## 2023-11-07 ENCOUNTER — APPOINTMENT (OUTPATIENT)
Dept: GENERAL RADIOLOGY | Age: 48
End: 2023-11-07
Payer: MEDICAID

## 2023-11-07 ENCOUNTER — APPOINTMENT (OUTPATIENT)
Dept: CT IMAGING | Age: 48
End: 2023-11-07
Payer: MEDICAID

## 2023-11-07 ENCOUNTER — HOSPITAL ENCOUNTER (EMERGENCY)
Age: 48
Discharge: HOME | End: 2023-11-07
Payer: MEDICAID

## 2023-11-07 VITALS
SYSTOLIC BLOOD PRESSURE: 129 MMHG | OXYGEN SATURATION: 96 % | BODY MASS INDEX: 34.99 KG/M2 | DIASTOLIC BLOOD PRESSURE: 93 MMHG | WEIGHT: 258 LBS | RESPIRATION RATE: 19 BRPM | HEART RATE: 77 BPM | TEMPERATURE: 97.7 F

## 2023-11-07 DIAGNOSIS — R16.1 SPLEEN ENLARGED: ICD-10-CM

## 2023-11-07 DIAGNOSIS — R03.0 ELEVATED BLOOD PRESSURE READING: ICD-10-CM

## 2023-11-07 DIAGNOSIS — R07.9 CHEST PAIN, UNSPECIFIED TYPE: Primary | ICD-10-CM

## 2023-11-07 LAB
ALBUMIN SERPL-MCNC: 4.3 G/DL (ref 3.4–5)
ALBUMIN/GLOB SERPL: 1.4 {RATIO} (ref 1.1–2.2)
ALP SERPL-CCNC: 81 U/L (ref 40–129)
ALT SERPL-CCNC: 19 U/L (ref 10–40)
ANION GAP SERPL CALCULATED.3IONS-SCNC: 12 MMOL/L (ref 3–16)
AST SERPL-CCNC: 21 U/L (ref 15–37)
BASOPHILS # BLD: 0 K/UL (ref 0–0.2)
BASOPHILS NFR BLD: 0.5 %
BILIRUB SERPL-MCNC: 0.3 MG/DL (ref 0–1)
BUN SERPL-MCNC: 8 MG/DL (ref 7–20)
CALCIUM SERPL-MCNC: 9.4 MG/DL (ref 8.3–10.6)
CHLORIDE SERPL-SCNC: 107 MMOL/L (ref 99–110)
CO2 SERPL-SCNC: 24 MMOL/L (ref 21–32)
CREAT SERPL-MCNC: 1 MG/DL (ref 0.9–1.3)
D DIMER: 0.31 UG/ML FEU (ref 0–0.6)
DEPRECATED RDW RBC AUTO: 13.2 % (ref 12.4–15.4)
EKG ATRIAL RATE: 106 BPM
EKG DIAGNOSIS: NORMAL
EKG P AXIS: 45 DEGREES
EKG P-R INTERVAL: 138 MS
EKG Q-T INTERVAL: 320 MS
EKG QRS DURATION: 84 MS
EKG QTC CALCULATION (BAZETT): 425 MS
EKG R AXIS: -7 DEGREES
EKG T AXIS: 35 DEGREES
EKG VENTRICULAR RATE: 106 BPM
EOSINOPHIL # BLD: 0.2 K/UL (ref 0–0.6)
EOSINOPHIL NFR BLD: 1.9 %
GFR SERPLBLD CREATININE-BSD FMLA CKD-EPI: >60 ML/MIN/{1.73_M2}
GLUCOSE SERPL-MCNC: 139 MG/DL (ref 70–99)
HCT VFR BLD AUTO: 41.2 % (ref 40.5–52.5)
HGB BLD-MCNC: 14.2 G/DL (ref 13.5–17.5)
LYMPHOCYTES # BLD: 1.9 K/UL (ref 1–5.1)
LYMPHOCYTES NFR BLD: 23.1 %
MCH RBC QN AUTO: 29.5 PG (ref 26–34)
MCHC RBC AUTO-ENTMCNC: 34.4 G/DL (ref 31–36)
MCV RBC AUTO: 85.7 FL (ref 80–100)
MONOCYTES # BLD: 0.5 K/UL (ref 0–1.3)
MONOCYTES NFR BLD: 5.9 %
NEUTROPHILS # BLD: 5.7 K/UL (ref 1.7–7.7)
NEUTROPHILS NFR BLD: 68.6 %
PLATELET # BLD AUTO: 239 K/UL (ref 135–450)
PMV BLD AUTO: 8.6 FL (ref 5–10.5)
POTASSIUM SERPL-SCNC: 3.7 MMOL/L (ref 3.5–5.1)
PROT SERPL-MCNC: 7.3 G/DL (ref 6.4–8.2)
RBC # BLD AUTO: 4.81 M/UL (ref 4.2–5.9)
SODIUM SERPL-SCNC: 143 MMOL/L (ref 136–145)
TROPONIN, HIGH SENSITIVITY: 10 NG/L (ref 0–22)
TROPONIN, HIGH SENSITIVITY: 9 NG/L (ref 0–22)
WBC # BLD AUTO: 8.4 K/UL (ref 4–11)

## 2023-11-07 PROCEDURE — 84484 ASSAY OF TROPONIN QUANT: CPT

## 2023-11-07 PROCEDURE — 71046 X-RAY EXAM CHEST 2 VIEWS: CPT

## 2023-11-07 PROCEDURE — 93005 ELECTROCARDIOGRAM TRACING: CPT | Performed by: EMERGENCY MEDICINE

## 2023-11-07 PROCEDURE — 93010 ELECTROCARDIOGRAM REPORT: CPT | Performed by: INTERNAL MEDICINE

## 2023-11-07 PROCEDURE — 6360000004 HC RX CONTRAST MEDICATION: Performed by: PHYSICIAN ASSISTANT

## 2023-11-07 PROCEDURE — 71260 CT THORAX DX C+: CPT

## 2023-11-07 PROCEDURE — 85379 FIBRIN DEGRADATION QUANT: CPT

## 2023-11-07 PROCEDURE — 2580000003 HC RX 258: Performed by: PHYSICIAN ASSISTANT

## 2023-11-07 PROCEDURE — 6370000000 HC RX 637 (ALT 250 FOR IP): Performed by: PHYSICIAN ASSISTANT

## 2023-11-07 PROCEDURE — 99285 EMERGENCY DEPT VISIT HI MDM: CPT

## 2023-11-07 PROCEDURE — 85025 COMPLETE CBC W/AUTO DIFF WBC: CPT

## 2023-11-07 PROCEDURE — 80053 COMPREHEN METABOLIC PANEL: CPT

## 2023-11-07 RX ORDER — 0.9 % SODIUM CHLORIDE 0.9 %
1000 INTRAVENOUS SOLUTION INTRAVENOUS ONCE
Status: COMPLETED | OUTPATIENT
Start: 2023-11-07 | End: 2023-11-07

## 2023-11-07 RX ORDER — ASPIRIN 325 MG
325 TABLET ORAL ONCE
Status: COMPLETED | OUTPATIENT
Start: 2023-11-07 | End: 2023-11-07

## 2023-11-07 RX ADMIN — ASPIRIN 325 MG: 325 TABLET ORAL at 19:44

## 2023-11-07 RX ADMIN — IOPAMIDOL 75 ML: 755 INJECTION, SOLUTION INTRAVENOUS at 20:03

## 2023-11-07 RX ADMIN — SODIUM CHLORIDE 1000 ML: 9 INJECTION, SOLUTION INTRAVENOUS at 19:43

## 2023-11-07 ASSESSMENT — PAIN - FUNCTIONAL ASSESSMENT: PAIN_FUNCTIONAL_ASSESSMENT: 0-10

## 2023-11-07 ASSESSMENT — HEART SCORE: ECG: 0

## 2023-11-07 ASSESSMENT — PAIN SCALES - GENERAL: PAINLEVEL_OUTOF10: 7

## 2023-11-07 NOTE — ED PROVIDER NOTES
[facility]  ED  EMERGENCY DEPARTMENT ENCOUNTER        Pt Name: Noemi Adam  MRN: [id number]  [address] 1975  Date of evaluation: 11/7/2023  Provider: Ponce Boast, PA  PCP: Aly Capellan MD  Note Started: 4:50 PM EST 11/7/23      ANDREWS. I have evaluated this patient. CHIEF COMPLAINT       Chief Complaint   Patient presents with    Chest Pain     Pt reports he was driving and developed back pain that radiates into mid chest with SOB. Pt reports over the \"last four days I have smoked a lot\". HISTORY OF PRESENT ILLNESS: 1 or more Elements     History from : Patient    Limitations to history : None    Noemi Adam is a 49 y.o. male who presents to the emergency department complaining of chest and back pain. The patient states this afternoon he started with pain across his mid back. While driving home he developed chest pain. He describes the chest pain as a squeezing sensation in the center of his chest.  Worse with deep inspiration. He states over the past 4 days he has smoked significantly more than normal.  He reports he has been more stressed than normal and although he previously quit smoking he has been smoking a significant amount over the past several days. He states he has not kept track of how many cigarettes he has smoked. He denies any leg swelling. No recent travel. Denies any cardiac history. He does take medication for his cholesterol. Denies any fevers or chills. No abdominal pain, nausea or vomiting. He has a mild headache but otherwise denies any lightheadedness or dizziness. Denies any recent viral illness. Denies sore throat or earaches. Nursing Notes were all reviewed and agreed with or any disagreements were addressed in the HPI. REVIEW OF SYSTEMS :      Review of Systems    Positives and Pertinent negatives as per HPI.      SURGICAL HISTORY     Past Surgical History:   Procedure Laterality Date    CYST REMOVAL N/A 1-

## 2023-11-07 NOTE — ED PROVIDER NOTES
The Ekg interpreted by me shows  sinus tachycardia, ckdp=[id number]    Axis is   Left axis deviation  QTc is  normal  Intervals and Durations are unremarkable.       ST Segments: normal  No significant change from prior EKG dated February 4, 2023        Arlene Cornejo MD  11/07/23 6006

## 2023-11-08 NOTE — DISCHARGE INSTRUCTIONS
You are seen in the emergency department today for chest pain. Your work-up in the emergency department is overall very reassuring. You were found to have an incidental finding of an enlarged spleen. Please follow-up with your primary care physician for further evaluation and treatment. If you develop any new or worsening symptoms including worsening chest pain, back pain, shortness of breath return to the emergency department for reevaluation.

## 2023-11-13 RX ORDER — MULTIVITAMIN,THERAPEUTIC
TABLET ORAL
Qty: 30 TABLET | Refills: 12 | Status: SHIPPED | OUTPATIENT
Start: 2023-11-13

## 2024-12-05 RX ORDER — ROSUVASTATIN CALCIUM 5 MG/1
5 TABLET, COATED ORAL NIGHTLY
Qty: 90 TABLET | Refills: 0 | Status: SHIPPED | OUTPATIENT
Start: 2024-12-05